# Patient Record
Sex: FEMALE | Race: WHITE | NOT HISPANIC OR LATINO | Employment: PART TIME | URBAN - METROPOLITAN AREA
[De-identification: names, ages, dates, MRNs, and addresses within clinical notes are randomized per-mention and may not be internally consistent; named-entity substitution may affect disease eponyms.]

---

## 2017-01-13 ENCOUNTER — OFFICE VISIT (OUTPATIENT)
Dept: FAMILY MEDICINE CLINIC | Facility: CLINIC | Age: 27
End: 2017-01-13

## 2017-01-13 VITALS
WEIGHT: 178 LBS | HEIGHT: 63 IN | DIASTOLIC BLOOD PRESSURE: 80 MMHG | HEART RATE: 82 BPM | RESPIRATION RATE: 17 BRPM | SYSTOLIC BLOOD PRESSURE: 130 MMHG | BODY MASS INDEX: 31.54 KG/M2 | OXYGEN SATURATION: 98 %

## 2017-01-13 DIAGNOSIS — R41.840 ATTENTION OR CONCENTRATION DEFICIT: ICD-10-CM

## 2017-01-13 DIAGNOSIS — L85.3 DRY SKIN: ICD-10-CM

## 2017-01-13 DIAGNOSIS — N92.1 METRORRHAGIA: Primary | ICD-10-CM

## 2017-01-13 DIAGNOSIS — F32.9 REACTIVE DEPRESSION: ICD-10-CM

## 2017-01-13 DIAGNOSIS — F84.5 ASPERGER'S DISORDER: ICD-10-CM

## 2017-01-13 DIAGNOSIS — N94.6 DYSMENORRHEA: ICD-10-CM

## 2017-01-13 PROCEDURE — 99214 OFFICE O/P EST MOD 30 MIN: CPT | Performed by: FAMILY MEDICINE

## 2017-01-13 RX ORDER — MELOXICAM 15 MG/1
15 TABLET ORAL
COMMUNITY
Start: 2016-11-23 | End: 2017-01-13

## 2017-01-13 RX ORDER — DULOXETIN HYDROCHLORIDE 60 MG/1
CAPSULE, DELAYED RELEASE ORAL
COMMUNITY
End: 2017-01-13

## 2017-01-13 RX ORDER — MUPIROCIN CALCIUM 20 MG/G
CREAM TOPICAL
COMMUNITY
End: 2017-01-13

## 2017-01-13 RX ORDER — PYRAZINAMIDE 500 MG/1
TABLET ORAL EVERY 4 HOURS
COMMUNITY
End: 2018-01-16

## 2017-01-13 RX ORDER — METHYLPHENIDATE HYDROCHLORIDE 18 MG/1
TABLET ORAL
COMMUNITY
End: 2017-01-13

## 2017-01-13 NOTE — PROGRESS NOTES
"Subjective   Ena Lyon is a 26 y.o. female.     History of Present Illness Here with her mother. Pt is adamant she does not want her mom to speak , at least until the end of the visit.  Last Dr was Dr Granado.  Hernated disc in L45 area. Surgery gave immed relief.This had not been precip by any event. R leg pain was not helped by PT. Then L leg started to hurt. Dr Hsieh ordered MRI. Dr Turner did surgery 2015 at [facility]. Back is \"OK\" Gets freaked out when it hurts now fearing it will happen again. Did PT post surgery. Does now do back exercises 4x a week.   In general does not like to exercise. ?Sometimes walks. 30 min would be common.    Goes to an on line program [employer] getting Masters in [employer] science. Scholarship. Nearly finished. Grad Dec '17. Works at [employer] and [employer].    Used to take Concerta and Cymbalta. Stopped both of them.   Thinks she needs to see a new psychologist or psychiatrist. Had seen Dr Garcia at [facility]. He is for adolescents. He had referred her to a different therapist. Had been to a therapist at [facility].    Had seen Dr Mercado - started pills at age 12 for very heavy periods. Stopped BCPs and menses not as heavy as prev. Wants to control acne and have smaller craming.  CO fatigue. Sleeps only 6 hours.    Skin dry on hands. Washes her hands numerous times all day long.    Has a hard time getting off the internet, says her mother. (Mother  since 1995)  The following portions of the patient's history were reviewed and updated as appropriate: allergies, current medications, past social history and problem list.    Review of Systems   Constitutional: Positive for fatigue. Negative for activity change, appetite change and unexpected weight change.   HENT: Negative for nosebleeds and trouble swallowing. Ear discharge: pruritis ear canals.    Eyes: Negative for pain and visual disturbance.   Respiratory: Negative for chest tightness, shortness of " "breath and wheezing.    Cardiovascular: Negative for chest pain and palpitations.   Gastrointestinal: Negative for abdominal pain and blood in stool.   Endocrine: Negative.    Genitourinary: Negative for difficulty urinating and hematuria.   Musculoskeletal: Positive for back pain (not severe at present, yang if she does exercises). Negative for joint swelling. Myalgias: no sciatica since surgery.   Skin: Negative for color change and rash.        Dry yang hands   Allergic/Immunologic: Negative.    Neurological: Negative for syncope and speech difficulty.   Hematological: Negative for adenopathy.   Psychiatric/Behavioral: Negative for agitation, confusion, decreased concentration and dysphoric mood. Behavioral problem: washes hands very freq. The patient is nervous/anxious.    All other systems reviewed and are negative.      Objective   Visit Vitals   • /80   • Pulse 82   • Resp 17   • Ht 63\" (160 cm)   • Wt 178 lb (80.7 kg)   • SpO2 98%   • BMI 31.53 kg/m2     Physical Exam   Constitutional: She is oriented to person, place, and time. She appears well-developed and well-nourished. No distress.   HENT:   Head: Normocephalic and atraumatic.   Eyes: Conjunctivae and EOM are normal. Pupils are equal, round, and reactive to light. Right eye exhibits no discharge. Left eye exhibits no discharge. No scleral icterus.   Neck: Normal range of motion. Neck supple. No tracheal deviation present. No thyromegaly present.   No bruits   Cardiovascular: Normal rate, regular rhythm, normal heart sounds, intact distal pulses and normal pulses.  Exam reveals no gallop.    No murmur heard.  Pulmonary/Chest: Effort normal and breath sounds normal. No respiratory distress. She has no wheezes. She has no rales.   Musculoskeletal: Normal range of motion.   R leg 33.5 inches. L leg 32.5 inches   Neurological: She is alert and oriented to person, place, and time. She exhibits normal muscle tone. Coordination normal.   Skin: Skin is " warm. No rash noted. No pallor.   Hands dry with mild eryth   Psychiatric: She has a normal mood and affect. Her behavior is normal. Judgment and thought content normal.   Controlling of her mother's comments.   Nursing note and vitals reviewed.      Assessment/Plan   Problem List Items Addressed This Visit        Musculoskeletal and Integument    Dry skin       Other    Asperger's disorder    Attention or concentration deficit    Depression    Dysmenorrhea      Other Visit Diagnoses     Metrorrhagia    -  Primary    Relevant Orders    Ambulatory Referral to Gynecology           Find a therapist.  If needs ADD med would need a psychiatrist. I am happy to Rx cymbalta at any time should she feel depressed.  Use her dry skin lotion every time she washes her hands.  Exercise, a variety of types is most beneficial.

## 2017-01-13 NOTE — MR AVS SNAPSHOT
Ena Lyon   1/13/2017 9:30 AM   Office Visit    Dept Phone:  [phone]   Encounter #:  [id number]    Provider:  Daiana Engle MD   Department:  [facility]                Your Full Care Plan              Today's Medication Changes          These changes are accurate as of: 1/13/17 10:44 AM.  If you have any questions, ask your nurse or doctor.               Stop taking medication(s)listed here:     BACTROBAN 2 % cream   Generic drug:  mupirocin           DULoxetine 60 MG capsule   Commonly known as:  CYMBALTA           methylphenidate 18 MG CR tablet   Commonly known as:  CONCERTA           MOBIC 15 MG tablet   Generic drug:  meloxicam                      Your Updated Medication List          This list is accurate as of: 1/13/17 10:44 AM.  Always use your most recent med list.                calcium carbonate 600 MG tablet   Commonly known as:  OS-OSMIN       cholecalciferol 1000 UNITS tablet   Commonly known as:  VITAMIN D3       ibuprofen 200 MG tablet   Commonly known as:  ADVIL,MOTRIN       MULTI VITAMIN/MINERALS PO       PROBIOTIC ADVANCED PO       SUPER B COMPLEX/C capsule       TYLENOL/CODEINE #3 300-30 MG per tablet   Generic drug:  acetaminophen-codeine               We Performed the Following     Ambulatory Referral to Gynecology       You Were Diagnosed With        Codes Comments    Metrorrhagia    -  Primary ICD-10-CM: N92.1  ICD-9-CM: 626.6       Instructions     None    Patient Instructions History      Upcoming Appointments     Visit Type Date Time Department    NEW PATIENT 1/13/2017  9:30 AM EVAN ELIZONDO      [employer] Signup     [facility] [employer] allows you to send messages to your doctor, view your test results, renew your prescriptions, schedule appointments, and more. To sign up, go to [employer] and click on the Sign Up Now link in the New User? box. Enter your [employer] Activation Code exactly as it  "appears below along with the last four digits of your Social Security Number and your Date of Birth () to complete the sign-up process. If you do not sign up before the expiration date, you must request a new code.    [employer] Activation Code: [email or web address]  Expires: 2017  5:36 AM    If you have questions, you can email [email or web address] or call [phone] to talk to our [employer] staff. Remember, [employer] is NOT to be used for urgent needs. For medical emergencies, dial 911.               Other Info from Your Visit           Allergies     Bupropion      Other reaction(s): SHAKY  Other reaction(s): BRIGHT      Reason for Visit     Establish Care           Vital Signs     Blood Pressure Pulse Respirations Height Weight Oxygen Saturation    130/80 82 17 63\" (160 cm) 178 lb (80.7 kg) 98%    Body Mass Index Smoking Status                31.53 kg/m2 Never Smoker          Problems and Diagnoses Noted     Bulging disc of backbone    Inequality of length of lower extremity    Vaginal bleeding between periods    -  Primary        "

## 2017-01-27 ENCOUNTER — OFFICE VISIT (OUTPATIENT)
Dept: OBSTETRICS AND GYNECOLOGY | Facility: CLINIC | Age: 27
End: 2017-01-27

## 2017-01-27 VITALS
DIASTOLIC BLOOD PRESSURE: 72 MMHG | SYSTOLIC BLOOD PRESSURE: 141 MMHG | BODY MASS INDEX: 31.54 KG/M2 | WEIGHT: 178 LBS | HEART RATE: 92 BPM | HEIGHT: 63 IN

## 2017-01-27 DIAGNOSIS — Z01.419 PAP SMEAR, AS PART OF ROUTINE GYNECOLOGICAL EXAMINATION: Primary | ICD-10-CM

## 2017-01-27 PROCEDURE — 99385 PREV VISIT NEW AGE 18-39: CPT | Performed by: OBSTETRICS & GYNECOLOGY

## 2017-01-27 RX ORDER — NORGESTIMATE AND ETHINYL ESTRADIOL 7DAYSX3 28
1 KIT ORAL DAILY
Qty: 28 TABLET | Refills: 12 | Status: SHIPPED | OUTPATIENT
Start: 2017-01-27 | End: 2017-02-24

## 2017-01-27 NOTE — MR AVS SNAPSHOT
[facility]  [phone]                    Ena Lyon   2017 8:00 AM   Office Visit    Dept Phone:  [phone]   Encounter #:  [id number]    Provider:  Jessica Coronado MD   Department:  [facility]                Your Full Care Plan              Your Updated Medication List          This list is accurate as of: 17  8:49 AM.  Always use your most recent med list.                calcium carbonate 600 MG tablet   Commonly known as:  OS-OSMIN       cholecalciferol 1000 UNITS tablet   Commonly known as:  VITAMIN D3       ibuprofen 200 MG tablet   Commonly known as:  ADVIL,MOTRIN       MULTI VITAMIN/MINERALS PO       PROBIOTIC ADVANCED PO       SUPER B COMPLEX/C capsule       TYLENOL/CODEINE #3 300-30 MG per tablet   Generic drug:  acetaminophen-codeine               We Performed the Following     Pap IG, Rfx HPV ASCU       You Were Diagnosed With        Codes Comments    Pap smear, as part of routine gynecological examination    -  Primary ICD-10-CM: Z01.419  ICD-9-CM: V76.2       Instructions     None    Patient Instructions History      Upcoming Appointments     Visit Type Date Time Department    NEW PATIENT 2017  8:00 AM EVAN CHAN      [employer] Signup     [facility] [employer] allows you to send messages to your doctor, view your test results, renew your prescriptions, schedule appointments, and more. To sign up, go to [employer] and click on the Sign Up Now link in the New User? box. Enter your [employer] Activation Code exactly as it appears below along with the last four digits of your Social Security Number and your Date of Birth () to complete the sign-up process. If you do not sign up before the expiration date, you must request a new code.    [employer] Activation Code: [email or web address]  Expires: 2/10/2017  8:48 AM    If you have questions, you can email [email or web address] or call [phone] to  "talk to our MyChart staff. Remember, MyChart is NOT to be used for urgent needs. For medical emergencies, dial 911.               Other Info from Your Visit           Allergies     Bupropion      Other reaction(s): SHAKY  Other reaction(s): SHAKY      Reason for Visit     Gynecologic Exam           Vital Signs     Blood Pressure Pulse Height Weight Last Menstrual Period Body Mass Index    141/72 92 63\" (160 cm) 178 lb (80.7 kg) 01/04/2017 (Exact Date) 31.53 kg/m2    Smoking Status                   Never Smoker           Problems and Diagnoses Noted     Screening for cervical cancer    -  Primary        "

## 2017-01-27 NOTE — PROGRESS NOTES
"Subjective   Ena Lyon is a 26 y.o. female annual exam.    History of Present Illness    Patient is a 26 y.o. for annual exam. Never sexually active. Patient wants to start birth control pills for menstrual cramping and acne. Patient was on ovcon 35 in the past but would like to try a different pill.    Health Maintenance   Topic Date Due   • PAP SMEAR  01/28/2016   • TDAP/TD VACCINES (3 - Td) 08/13/2019   • INFLUENZA VACCINE  Completed       The following portions of the patient's history were reviewed and updated as appropriate: allergies, current medications, past family history, past medical history, past social history, past surgical history and problem list.    Review of Systems   Genitourinary:        See HPI   All other systems reviewed and are negative.      Vitals:    01/27/17 0828   BP: 141/72   Pulse: 92   Weight: 178 lb (80.7 kg)   Height: 63\" (160 cm)       Objective   Physical Exam   Constitutional: She is oriented to person, place, and time. She appears well-developed and well-nourished.   HENT:   Head: Normocephalic and atraumatic.   Eyes: Conjunctivae and EOM are normal.   Neck: Normal range of motion. Neck supple. No thyromegaly present.   Cardiovascular: Normal rate, regular rhythm and normal heart sounds.    Pulmonary/Chest: Effort normal and breath sounds normal. Right breast exhibits no mass, no nipple discharge and no tenderness. Left breast exhibits no mass, no nipple discharge and no tenderness.   Abdominal: Soft. Bowel sounds are normal. There is no hepatosplenomegaly. There is no tenderness. No hernia.   Genitourinary: Rectum normal, vagina normal and uterus normal. Rectal exam shows no external hemorrhoid. There is no rash, tenderness or lesion on the right labia. There is no rash, tenderness or lesion on the left labia. Uterus is not enlarged and not tender. Cervix exhibits no discharge. Right adnexum displays no mass and no tenderness. Left adnexum displays no mass and no " tenderness. No tenderness in the vagina. No vaginal discharge found.   Musculoskeletal: Normal range of motion. She exhibits no edema.   Lymphadenopathy:        Right: No inguinal adenopathy present.        Left: No inguinal adenopathy present.   Neurological: She is alert and oriented to person, place, and time.   Skin: Skin is warm and dry. No rash noted.   Psychiatric: She has a normal mood and affect.   Vitals reviewed.        Assessment/Plan   Ena was seen today for gynecologic exam.    Diagnoses and all orders for this visit:    Pap smear, as part of routine gynecological examination  -     Pap IG, Rfx HPV ASCU                 Return in about 1 year (around 1/27/2018).

## 2017-02-01 LAB
CONV .: NORMAL
CYTOLOGIST CVX/VAG CYTO: NORMAL
CYTOLOGY CVX/VAG DOC THIN PREP: NORMAL
DX ICD CODE: NORMAL
HIV 1 & 2 AB SER-IMP: NORMAL
OTHER STN SPEC: NORMAL
PATH REPORT.FINAL DX SPEC: NORMAL
STAT OF ADQ CVX/VAG CYTO-IMP: NORMAL

## 2017-02-27 ENCOUNTER — TELEPHONE (OUTPATIENT)
Dept: OBSTETRICS AND GYNECOLOGY | Facility: CLINIC | Age: 27
End: 2017-02-27

## 2017-02-27 RX ORDER — NORETHINDRONE ACETATE AND ETHINYL ESTRADIOL 1MG-20(21)
1 KIT ORAL DAILY
Qty: 28 TABLET | Refills: 12 | Status: SHIPPED | OUTPATIENT
Start: 2017-02-27 | End: 2017-04-03

## 2017-02-27 NOTE — TELEPHONE ENCOUNTER
----- Message from Selena Ann sent at 2/27/2017  9:48 AM EST -----  Contact: patient  Patient said tri-sprintec tablet has not helped her with acne, and has also had side effects of depression. Patient is requesting to try a new birth control pill that she hopes will not have this side effect    C/B [phone].     I switched her to a lower dose pill. It can take 3-4 months to see a difference in acne and to get use to the pill.

## 2017-04-03 ENCOUNTER — OFFICE VISIT (OUTPATIENT)
Dept: FAMILY MEDICINE CLINIC | Facility: CLINIC | Age: 27
End: 2017-04-03

## 2017-04-03 VITALS
BODY MASS INDEX: 30.12 KG/M2 | RESPIRATION RATE: 17 BRPM | TEMPERATURE: 99.1 F | SYSTOLIC BLOOD PRESSURE: 120 MMHG | HEIGHT: 63 IN | OXYGEN SATURATION: 98 % | DIASTOLIC BLOOD PRESSURE: 80 MMHG | HEART RATE: 95 BPM | WEIGHT: 170 LBS

## 2017-04-03 DIAGNOSIS — H66.001 ACUTE SUPPURATIVE OTITIS MEDIA OF RIGHT EAR WITHOUT SPONTANEOUS RUPTURE OF TYMPANIC MEMBRANE, RECURRENCE NOT SPECIFIED: Primary | ICD-10-CM

## 2017-04-03 PROCEDURE — 99213 OFFICE O/P EST LOW 20 MIN: CPT | Performed by: FAMILY MEDICINE

## 2017-04-03 RX ORDER — NORETHINDRONE ACETATE AND ETHINYL ESTRADIOL AND FERROUS FUMARATE 1MG-20(24)
1 KIT ORAL DAILY
COMMUNITY
End: 2017-11-30 | Stop reason: SDUPTHER

## 2017-04-03 RX ORDER — AMOXICILLIN AND CLAVULANATE POTASSIUM 875; 125 MG/1; MG/1
1 TABLET, FILM COATED ORAL 2 TIMES DAILY
Qty: 14 TABLET | Refills: 0 | Status: SHIPPED | OUTPATIENT
Start: 2017-04-03 | End: 2017-11-30

## 2017-04-03 NOTE — PROGRESS NOTES
"Subjective   Ena Lyon is a 26 y.o. female.     History of Present Illness Here bc of sore throat since 5 days. Rhinitis and achy the next day. Temp up but no thermometer used. Ears popping and hurting, yang the R. Min cough yesterday only. Entire face hurts, ears the most.     The following portions of the patient's history were reviewed and updated as appropriate: allergies, current medications, past social history and problem list.    Review of Systems   Constitutional: Positive for fatigue. Negative for activity change and unexpected weight change.   HENT: Positive for congestion, ear pain (R), postnasal drip and sore throat.    Eyes: Negative for pain and discharge.   Respiratory: Positive for cough. Negative for chest tightness, shortness of breath and wheezing.    Cardiovascular: Negative for chest pain and palpitations.   Gastrointestinal: Negative for abdominal pain, diarrhea and vomiting.   Endocrine: Negative.    Genitourinary: Negative.    Musculoskeletal: Negative for joint swelling.   Skin: Negative for color change, rash and wound.   Allergic/Immunologic: Negative.    Neurological: Negative for seizures and syncope.   Psychiatric/Behavioral: Negative.        Objective   /80  Pulse 95  Temp 99.1 °F (37.3 °C)  Resp 17  Ht 63\" (160 cm)  Wt 170 lb (77.1 kg)  SpO2 98%  BMI 30.11 kg/m2  Physical Exam   Constitutional: She appears well-developed and well-nourished.   HENT:   Head: Normocephalic and atraumatic.   Right Ear: Tympanic membrane and ear canal normal.   Left Ear: Tympanic membrane, external ear and ear canal normal.   Mouth/Throat: Oropharynx is clear and moist.   Cerumen in R ear canal   Eyes: Conjunctivae are normal. Right eye exhibits no discharge. Left eye exhibits no discharge.   Neck: Normal range of motion. Neck supple. No thyromegaly present.   Cardiovascular: Normal rate, regular rhythm and normal heart sounds.    Pulmonary/Chest: Effort normal and breath sounds normal. No " respiratory distress. She has no wheezes. She has no rales.   Lymphadenopathy:     She has no cervical adenopathy.   Skin: Skin is warm and dry.   Psychiatric: She has a normal mood and affect. Judgment normal.   Vitals reviewed.      Assessment/Plan   Problem List Items Addressed This Visit     None      Visit Diagnoses     Acute suppurative otitis media of right ear without spontaneous rupture of tympanic membrane, recurrence not specified    -  Primary    Relevant Medications    amoxicillin-clavulanate (AUGMENTIN) 875-125 MG per tablet

## 2017-06-09 ENCOUNTER — TELEPHONE (OUTPATIENT)
Dept: OBSTETRICS AND GYNECOLOGY | Facility: CLINIC | Age: 27
End: 2017-06-09

## 2017-06-09 NOTE — TELEPHONE ENCOUNTER
----- Message from Selena Ann sent at 6/9/2017 12:02 PM EDT -----  Contact: Patient  Patient missed her birth control for one day and started her cycle a week and a half early. She said her periods are usually regular so she was a little concerned. Can this be normal after missing one pill?     Pt cb [phone]    Yes, it can be normal. She should take a pregnancy test to be on the safe side. Her periods may take 2-3 months to regulate.

## 2017-11-30 ENCOUNTER — OFFICE VISIT (OUTPATIENT)
Dept: FAMILY MEDICINE CLINIC | Facility: CLINIC | Age: 27
End: 2017-11-30

## 2017-11-30 VITALS
HEIGHT: 63 IN | OXYGEN SATURATION: 97 % | BODY MASS INDEX: 29.23 KG/M2 | HEART RATE: 76 BPM | RESPIRATION RATE: 17 BRPM | SYSTOLIC BLOOD PRESSURE: 164 MMHG | WEIGHT: 165 LBS | DIASTOLIC BLOOD PRESSURE: 94 MMHG

## 2017-11-30 DIAGNOSIS — F84.5 ASPERGER'S DISORDER: ICD-10-CM

## 2017-11-30 DIAGNOSIS — E55.9 HYPOVITAMINOSIS D: ICD-10-CM

## 2017-11-30 DIAGNOSIS — Z23 ENCOUNTER FOR IMMUNIZATION: ICD-10-CM

## 2017-11-30 DIAGNOSIS — Z00.00 HEALTH CARE MAINTENANCE: ICD-10-CM

## 2017-11-30 DIAGNOSIS — R03.0 ELEVATED BLOOD PRESSURE READING: ICD-10-CM

## 2017-11-30 DIAGNOSIS — Z00.00 ANNUAL PHYSICAL EXAM: Primary | ICD-10-CM

## 2017-11-30 DIAGNOSIS — R04.0 EPISTAXIS: ICD-10-CM

## 2017-11-30 DIAGNOSIS — F32.9 REACTIVE DEPRESSION: ICD-10-CM

## 2017-11-30 PROCEDURE — 90656 IIV3 VACC NO PRSV 0.5 ML IM: CPT | Performed by: FAMILY MEDICINE

## 2017-11-30 PROCEDURE — 99213 OFFICE O/P EST LOW 20 MIN: CPT | Performed by: FAMILY MEDICINE

## 2017-11-30 PROCEDURE — 90471 IMMUNIZATION ADMIN: CPT | Performed by: FAMILY MEDICINE

## 2017-11-30 PROCEDURE — 99395 PREV VISIT EST AGE 18-39: CPT | Performed by: FAMILY MEDICINE

## 2017-11-30 RX ORDER — FLUOXETINE HYDROCHLORIDE 40 MG/1
CAPSULE ORAL
COMMUNITY
Start: 2017-09-22 | End: 2018-01-16

## 2017-11-30 RX ORDER — ISOTRETINOIN 40 MG/1
CAPSULE, LIQUID FILLED ORAL
COMMUNITY
Start: 2017-11-16 | End: 2018-08-13

## 2017-11-30 RX ORDER — VALACYCLOVIR HYDROCHLORIDE 1 G/1
TABLET, FILM COATED ORAL
COMMUNITY
Start: 2017-10-09 | End: 2017-11-30

## 2017-11-30 RX ORDER — NORETHINDRONE ACETATE AND ETHINYL ESTRADIOL 1MG-20(21)
KIT ORAL
COMMUNITY
Start: 2017-11-11 | End: 2019-05-31

## 2017-11-30 RX ORDER — BUSPIRONE HYDROCHLORIDE 15 MG/1
TABLET ORAL
COMMUNITY
Start: 2017-09-22 | End: 2018-01-16 | Stop reason: SDUPTHER

## 2017-11-30 NOTE — PROGRESS NOTES
"Subjective   Ena Lyon is a 27 y.o. female.     History of Present Illness  Here for a CPE.  Not sure why her BP is elevated. Stuffy nose and hard to breath. Acne med makes her nose stuffy .  Gets huge blood clots nasal, 3x in 5 days.  No extreme anxiety.  But she has felt cold and clammy  But is not more anxious than usual.Not ill: no sore throat or cough.    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  Grad school for [employer] science.    Anxious. Had been going to therapist for 6 mo, but stopped bc busy.  Goes to pyschiatrist- Dr CANNON (she cannot remember his name)  Sleeps 8 hours plus a nap. Always tired. Not walking for exercise bc too tired.  Rarely drinks alcohol. Eating smaller portions of foods. Has lost 13 pounds this year that way. Did have a period of time when she lost appetite.    The following portions of the patient's history were reviewed and updated as appropriate: allergies, current medications, past social history and problem list.    Review of Systems   Constitutional: Positive for fatigue. Negative for appetite change, fever and unexpected weight change.   HENT: Positive for nosebleeds. Negative for ear pain, facial swelling and sore throat.    Eyes: Negative for pain and visual disturbance.   Respiratory: Negative for chest tightness, shortness of breath and wheezing.    Cardiovascular: Negative for chest pain and palpitations.   Gastrointestinal: Negative for abdominal pain and blood in stool.   Endocrine: Negative.    Genitourinary: Negative for difficulty urinating and hematuria.   Musculoskeletal: Negative for joint swelling.   Neurological: Negative for tremors, seizures and syncope.   Hematological: Negative for adenopathy.   Psychiatric/Behavioral: The patient is nervous/anxious.        Objective   /94  Pulse 76  Resp 17  Ht 63\" (160 cm)  Wt 165 lb (74.8 kg)  SpO2 97%  BMI 29.23 kg/m2  Physical Exam   Constitutional: She is oriented to person, place, and time. She " appears well-developed and well-nourished. No distress.   HENT:   Head: Normocephalic and atraumatic. Hair is normal.   Right Ear: Hearing, tympanic membrane, external ear and ear canal normal. No drainage. No decreased hearing is noted.   Left Ear: Hearing, tympanic membrane, external ear and ear canal normal. No decreased hearing is noted.   Nose: No nasal deformity.   Mouth/Throat: Oropharynx is clear and moist.   Eyes: Conjunctivae, EOM and lids are normal. Pupils are equal, round, and reactive to light. Lids are everted and swept, no foreign bodies found. Right eye exhibits no discharge. Left eye exhibits no discharge.   Fundoscopic exam:       The right eye shows red reflex.        The left eye shows red reflex.   Neck: Normal range of motion. Neck supple. No JVD present. No tracheal deviation present. No thyromegaly present.   Cardiovascular: Normal rate, regular rhythm, normal heart sounds, intact distal pulses and normal pulses.  Exam reveals no gallop and no friction rub.    No murmur heard.  Pulmonary/Chest: Effort normal and breath sounds normal. No respiratory distress. She has no wheezes. She has no rales. She exhibits no tenderness. Right breast exhibits no mass and no tenderness. Left breast exhibits no mass and no tenderness.   Abdominal: Soft. Bowel sounds are normal. She exhibits no distension and no mass. There is no tenderness. There is no rebound and no guarding. No hernia.   Musculoskeletal: Normal range of motion. She exhibits no edema, tenderness or deformity.   Lymphadenopathy:     She has no cervical adenopathy.        Right: No inguinal adenopathy present.        Left: No inguinal adenopathy present.   Neurological: She is alert and oriented to person, place, and time. She has normal reflexes. She displays normal reflexes. No cranial nerve deficit. She exhibits normal muscle tone. Coordination normal.   Skin: Skin is warm and dry. No rash noted. She is not diaphoretic. No erythema.    Psychiatric: She has a normal mood and affect. Her behavior is normal. Judgment and thought content normal.   Nursing note and vitals reviewed.      Assessment/Plan   Problem List Items Addressed This Visit        Other    Asperger's disorder    Relevant Medications    busPIRone (BUSPAR) 15 MG tablet    FLUoxetine (PROzac) 40 MG capsule    Depression    Relevant Medications    busPIRone (BUSPAR) 15 MG tablet    FLUoxetine (PROzac) 40 MG capsule    Health care maintenance      Other Visit Diagnoses     Annual physical exam    -  Primary    Relevant Orders    TSH    Comprehensive Metabolic Panel    Hypovitaminosis D        Relevant Orders    Vitamin D 25 Hydroxy    Epistaxis        Relevant Medications    mupirocin (BACTROBAN) 2 % ointment    Encounter for immunization        Relevant Orders    Flu Vaccine Intradermal Quad 18-64YR (FLUZONE INJ [id number]) (Completed)    Elevated blood pressure reading               Stop in for BP check next week.I really feel that she is very anxious in general, and about todays exam as well.  Had CBC,lipid at gyne in Nov.

## 2017-12-01 LAB
25(OH)D3+25(OH)D2 SERPL-MCNC: 84.9 NG/ML (ref 30–100)
ALBUMIN SERPL-MCNC: 4.8 G/DL (ref 3.5–5.2)
ALBUMIN/GLOB SERPL: 1.7 G/DL
ALP SERPL-CCNC: 62 U/L (ref 39–117)
ALT SERPL-CCNC: 17 U/L (ref 1–33)
AST SERPL-CCNC: 18 U/L (ref 1–32)
BILIRUB SERPL-MCNC: 0.6 MG/DL (ref 0.1–1.2)
BUN SERPL-MCNC: 11 MG/DL (ref 6–20)
BUN/CREAT SERPL: 13.8 (ref 7–25)
CALCIUM SERPL-MCNC: 10 MG/DL (ref 8.6–10.5)
CHLORIDE SERPL-SCNC: 99 MMOL/L (ref 98–107)
CO2 SERPL-SCNC: 26.8 MMOL/L (ref 22–29)
CREAT SERPL-MCNC: 0.8 MG/DL (ref 0.57–1)
GFR SERPLBLD CREATININE-BSD FMLA CKD-EPI: 104 ML/MIN/1.73
GFR SERPLBLD CREATININE-BSD FMLA CKD-EPI: 86 ML/MIN/1.73
GLOBULIN SER CALC-MCNC: 2.9 GM/DL
GLUCOSE SERPL-MCNC: 88 MG/DL (ref 65–99)
POTASSIUM SERPL-SCNC: 4.4 MMOL/L (ref 3.5–5.2)
PROT SERPL-MCNC: 7.7 G/DL (ref 6–8.5)
SODIUM SERPL-SCNC: 139 MMOL/L (ref 136–145)
TSH SERPL DL<=0.005 MIU/L-ACNC: 1.88 MIU/ML (ref 0.27–4.2)

## 2017-12-07 ENCOUNTER — TELEPHONE (OUTPATIENT)
Dept: FAMILY MEDICINE CLINIC | Facility: CLINIC | Age: 27
End: 2017-12-07

## 2017-12-07 ENCOUNTER — CLINICAL SUPPORT (OUTPATIENT)
Dept: FAMILY MEDICINE CLINIC | Facility: CLINIC | Age: 27
End: 2017-12-07

## 2017-12-07 VITALS — SYSTOLIC BLOOD PRESSURE: 140 MMHG | DIASTOLIC BLOOD PRESSURE: 94 MMHG

## 2017-12-07 DIAGNOSIS — I10 ESSENTIAL HYPERTENSION: ICD-10-CM

## 2017-12-07 NOTE — TELEPHONE ENCOUNTER
140/94- please put into her vital record.Tell her she needs to get in with one of the new drs soon. She may have hypertension and needs to start medicine. ONe more elevation, and she needs meds.

## 2017-12-08 NOTE — TELEPHONE ENCOUNTER
Message left on voicemail to come into office or have checked at a pharmacy and call us with the numbers

## 2018-01-16 ENCOUNTER — OFFICE VISIT (OUTPATIENT)
Dept: FAMILY MEDICINE CLINIC | Facility: CLINIC | Age: 28
End: 2018-01-16

## 2018-01-16 VITALS
HEART RATE: 89 BPM | SYSTOLIC BLOOD PRESSURE: 118 MMHG | BODY MASS INDEX: 30.29 KG/M2 | OXYGEN SATURATION: 99 % | WEIGHT: 171 LBS | TEMPERATURE: 98.4 F | DIASTOLIC BLOOD PRESSURE: 82 MMHG

## 2018-01-16 DIAGNOSIS — R05.9 COUGH: ICD-10-CM

## 2018-01-16 DIAGNOSIS — F32.A DEPRESSION, UNSPECIFIED DEPRESSION TYPE: ICD-10-CM

## 2018-01-16 DIAGNOSIS — J11.1 FLU: Primary | ICD-10-CM

## 2018-01-16 LAB
EXPIRATION DATE: NORMAL
FLUAV AG NPH QL: NEGATIVE
FLUBV AG NPH QL: NEGATIVE
INTERNAL CONTROL: NORMAL
Lab: NORMAL

## 2018-01-16 PROCEDURE — 99214 OFFICE O/P EST MOD 30 MIN: CPT | Performed by: NURSE PRACTITIONER

## 2018-01-16 PROCEDURE — 87804 INFLUENZA ASSAY W/OPTIC: CPT | Performed by: NURSE PRACTITIONER

## 2018-01-16 RX ORDER — BUSPIRONE HYDROCHLORIDE 15 MG/1
15 TABLET ORAL 3 TIMES DAILY
Qty: 90 TABLET | Refills: 3 | Status: SHIPPED | OUTPATIENT
Start: 2018-01-16 | End: 2018-08-13 | Stop reason: DRUGHIGH

## 2018-01-16 RX ORDER — FLUOXETINE HYDROCHLORIDE 40 MG/1
40 CAPSULE ORAL DAILY
Qty: 30 CAPSULE | Refills: 3 | Status: SHIPPED | OUTPATIENT
Start: 2018-01-16 | End: 2018-02-15 | Stop reason: SDUPTHER

## 2018-01-16 NOTE — PATIENT INSTRUCTIONS
"Influenza, Adult  Influenza (“the flu\") is an infection in the lungs, nose, and throat (respiratory tract). It is caused by a virus. The flu causes many common cold symptoms, as well as a high fever and body aches. It can make you feel very sick.  The flu spreads easily from person to person (is contagious). Getting a flu shot (influenza vaccination) every year is the best way to prevent the flu.  Follow these instructions at home:  · Take over-the-counter and prescription medicines only as told by your doctor.  · Use a cool mist humidifier to add moisture (humidity) to the air in your home. This can make it easier to breathe.  · Rest as needed.  · Drink enough fluid to keep your pee (urine) clear or pale yellow.  · Cover your mouth and nose when you cough or sneeze.  · Wash your hands with soap and water often, especially after you cough or sneeze. If you cannot use soap and water, use hand .  · Stay home from work or school as told by your doctor. Unless you are visiting your doctor, try to avoid leaving home until your fever has been gone for 24 hours without the use of medicine.  · Keep all follow-up visits as told by your doctor. This is important.  How is this prevented?  · Getting a yearly (annual) flu shot is the best way to avoid getting the flu. You may get the flu shot in late summer, fall, or winter. Ask your doctor when you should get your flu shot.  · Wash your hands often or use hand  often.  · Avoid contact with people who are sick during cold and flu season.  · Eat healthy foods.  · Drink plenty of fluids.  · Get enough sleep.  · Exercise regularly.  Contact a doctor if:  · You get new symptoms.  · You have:  ¨ Chest pain.  ¨ Watery poop (diarrhea).  ¨ A fever.  · Your cough gets worse.  · You start to have more mucus.  · You feel sick to your stomach (nauseous).  · You throw up (vomit).  Get help right away if:  · You start to be short of breath or have trouble breathing.  · Your " skin or nails turn a bluish color.  · You have very bad pain or stiffness in your neck.  · You get a sudden headache.  · You get sudden pain in your face or ear.  · You cannot stop throwing up.  This information is not intended to replace advice given to you by your health care provider. Make sure you discuss any questions you have with your health care provider.  Document Released: 09/26/2009 Document Revised: 05/25/2017 Document Reviewed: 10/11/2016  [employer] Interactive Patient Education © 2017 [employer]

## 2018-01-16 NOTE — PROGRESS NOTES
Subjective   Ena Lyon is a 27 y.o. female.     HPI Comments: Patient complains cough congestion body aches sore throat since Saturday  Ear congestion  She's had chills feels feverish    No shortness of breath    No history of asthma no immunocompromise  No tobacco    History depression anxiety  Her psychiatrist recently left the practice practices not have anyone covering and no one to recommend  She's been stable on her medication BuSpar and Prozac for 8 months  Depression and anxiety stable no history of bipolar or manic depression never suicidal ideation never hospitalizations           The following portions of the patient's history were reviewed and updated as appropriate: allergies, current medications, past family history, past social history, past surgical history and problem list.    Review of Systems   Constitutional: Positive for chills, fatigue and fever. Negative for appetite change and unexpected weight change.   HENT: Positive for postnasal drip, sinus pressure and sore throat. Negative for congestion and ear pain.    Eyes: Negative.    Respiratory: Positive for cough. Negative for chest tightness, shortness of breath and wheezing.    Cardiovascular: Negative for chest pain, palpitations and leg swelling.   Gastrointestinal: Negative for abdominal pain and constipation.   Genitourinary: Negative for difficulty urinating, dysuria and urgency.   Musculoskeletal: Negative for arthralgias and myalgias.   Skin: Negative for rash and wound.   Neurological: Negative for dizziness, speech difficulty, weakness, numbness and headaches.   Psychiatric/Behavioral: Negative for sleep disturbance. The patient is not nervous/anxious.        Objective   Physical Exam   Constitutional: She is oriented to person, place, and time. She appears well-developed and well-nourished.   Nontoxic pleasant   HENT:   Head: Normocephalic.   Mouth/Throat: Oropharynx is clear and moist.   Tm clear patrick no mass canal patent without  d/c\    Turbinates congested   Eyes: Conjunctivae are normal. Pupils are equal, round, and reactive to light. No scleral icterus.   Neck: Neck supple. No JVD present. No thyromegaly present.   Cardiovascular: Normal rate, regular rhythm and normal heart sounds.  Exam reveals no gallop and no friction rub.    No murmur heard.  Pulmonary/Chest: Effort normal and breath sounds normal. No stridor. No respiratory distress. She has no wheezes. She has no rales.   Abdominal: Soft. Bowel sounds are normal. She exhibits no distension. There is no tenderness.   No hepatosplenomegaly, no ascites,   Musculoskeletal: She exhibits no edema or tenderness.   Lymphadenopathy:     She has no cervical adenopathy.   Neurological: She is alert and oriented to person, place, and time. She has normal reflexes.   Skin: Skin is warm and dry. No rash noted. No erythema.   Psychiatric: She has a normal mood and affect. Her behavior is normal. Judgment and thought content normal.   Vitals reviewed.      Assessment/Plan   Ena was seen today for patient c/o cough sore throat.    Diagnoses and all orders for this visit:    Flu    Cough  -     POC Influenza A / B    Depression, unspecified depression type    Other orders  -     busPIRone (BUSPAR) 15 MG tablet; Take 1 tablet by mouth 3 (Three) Times a Day.  -     FLUoxetine (PROzac) 40 MG capsule; Take 1 capsule by mouth Daily.                  Patient's symptoms are flulike  Although her flu test is negative likely has flu with  Onset of sore throat body aches fever chills  Cough congestion  No chest pain or shortness of breath  Her chest is clear    Push fluids  Plenty of rest  OTC medicine for cough or congestion such as Mucinex DM  Tylenol or ibuprofen 600 as needed for fever aches or pains    Urgent recheck urgent care ER for late onset fever late onset worsening    Refills for Prozac and BuSpar  If increased depression suicidal ideation  Urgent recheck you are  She may schedule appointment  with psychiatry  Otherwise she may follow-up here in 4 months sooner for any problems    Reviewed medication list  She denies pregnancy  She is compliant with contraception  She sees dermatology and takes Accutane

## 2018-01-23 ENCOUNTER — OFFICE VISIT (OUTPATIENT)
Dept: FAMILY MEDICINE CLINIC | Facility: CLINIC | Age: 28
End: 2018-01-23

## 2018-01-23 VITALS
HEART RATE: 90 BPM | DIASTOLIC BLOOD PRESSURE: 80 MMHG | SYSTOLIC BLOOD PRESSURE: 140 MMHG | WEIGHT: 173 LBS | BODY MASS INDEX: 30.65 KG/M2 | TEMPERATURE: 97.6 F | HEIGHT: 63 IN

## 2018-01-23 DIAGNOSIS — H60.501 ACUTE OTITIS EXTERNA OF RIGHT EAR, UNSPECIFIED TYPE: ICD-10-CM

## 2018-01-23 DIAGNOSIS — H61.21 IMPACTED CERUMEN OF RIGHT EAR: Primary | ICD-10-CM

## 2018-01-23 DIAGNOSIS — R03.0 ELEVATED BLOOD PRESSURE READING: ICD-10-CM

## 2018-01-23 PROCEDURE — 99214 OFFICE O/P EST MOD 30 MIN: CPT | Performed by: NURSE PRACTITIONER

## 2018-01-23 NOTE — PATIENT INSTRUCTIONS
Earwax Buildup  Your ears make a substance called earwax. It may also be called cerumen. Sometimes, too much earwax builds up in your ear canal. This can cause ear pain and make it harder for you to hear.  CAUSES  This condition is caused by too much earwax production or buildup.  RISK FACTORS  The following factors may make you more likely to develop this condition:  · Cleaning your ears often with swabs.  · Having narrow ear canals.  · Having earwax that is overly thick or sticky.  · Having eczema.  · Being dehydrated.  SYMPTOMS  Symptoms of this condition include:  · Reduced hearing.  · Ear drainage.  · Ear pain.  · Ear itch.  · A feeling of fullness in the ear or feeling that the ear is plugged.  · Ringing in the ear.  · Coughing.  DIAGNOSIS  Your health care provider can diagnose this condition based on your symptoms and medical history. Your health care provider will also do an ear exam to look inside your ear with a scope (otoscope). You may also have a hearing test.  TREATMENT  Treatment for this condition includes:  · Over-the-counter or prescription ear drops to soften the earwax.  · Earwax removal by a health care provider. This may be done:  ¨ By flushing the ear with body-temperature water.  ¨ With a medical instrument that has a loop at the end (earwax curette).  ¨ With a suction device.  HOME CARE INSTRUCTIONS  · Take over-the-counter and prescription medicines only as told by your health care provider.  · Do not put any objects, including an ear swab, into your ear. You can clean the opening of your ear canal with a washcloth.  · Drink enough water to keep your urine clear or pale yellow.  · If you have frequent earwax buildup or you use hearing aids, consider seeing your health care provider every 6-12 months for routine preventive ear cleanings. Keep all follow-up visits as told by your health care provider.  SEEK MEDICAL CARE IF:  · You have ear pain.  · Your condition does not improve with  treatment.  · You have hearing loss.  · You have blood, pus, or other fluid coming from your ear.  This information is not intended to replace advice given to you by your health care provider. Make sure you discuss any questions you have with your health care provider.  Document Released: 01/25/2006 Document Revised: 04/10/2017 Document Reviewed: 08/03/2016  [employer] Interactive Patient Education © 2017 [employer]    Earwax Buildup  Your ears make a substance called earwax. It may also be called cerumen. Sometimes, too much earwax builds up in your ear canal. This can cause ear pain and make it harder for you to hear.  CAUSES  This condition is caused by too much earwax production or buildup.  RISK FACTORS  The following factors may make you more likely to develop this condition:  · Cleaning your ears often with swabs.  · Having narrow ear canals.  · Having earwax that is overly thick or sticky.  · Having eczema.  · Being dehydrated.  SYMPTOMS  Symptoms of this condition include:  · Reduced hearing.  · Ear drainage.  · Ear pain.  · Ear itch.  · A feeling of fullness in the ear or feeling that the ear is plugged.  · Ringing in the ear.  · Coughing.  DIAGNOSIS  Your health care provider can diagnose this condition based on your symptoms and medical history. Your health care provider will also do an ear exam to look inside your ear with a scope (otoscope). You may also have a hearing test.  TREATMENT  Treatment for this condition includes:  · Over-the-counter or prescription ear drops to soften the earwax.  · Earwax removal by a health care provider. This may be done:  ¨ By flushing the ear with body-temperature water.  ¨ With a medical instrument that has a loop at the end (earwax curette).  ¨ With a suction device.  HOME CARE INSTRUCTIONS  · Take over-the-counter and prescription medicines only as told by your health care provider.  · Do not put any objects, including an ear swab, into your ear. You can clean the  opening of your ear canal with a washcloth.  · Drink enough water to keep your urine clear or pale yellow.  · If you have frequent earwax buildup or you use hearing aids, consider seeing your health care provider every 6-12 months for routine preventive ear cleanings. Keep all follow-up visits as told by your health care provider.  SEEK MEDICAL CARE IF:  · You have ear pain.  · Your condition does not improve with treatment.  · You have hearing loss.  · You have blood, pus, or other fluid coming from your ear.  This information is not intended to replace advice given to you by your health care provider. Make sure you discuss any questions you have with your health care provider.  Document Released: 01/25/2006 Document Revised: 04/10/2017 Document Reviewed: 08/03/2016  [employer] Interactive Patient Education © 2017 [employer]        Discharge instructions    Recheck if increasing ear pain fever  Recheck ears in 6 months  Recheck blood pressure  Sometime within the next week  Check blood pressure  Weekly ×3 weeks  Then monthly  ×3 months    Low-dose Sudafed such as 30-60 mg  Twice daily as needed for sinus pressure only if blood pressures controlled    If averaging greater than 140/90  Return office  Decrease sodium  Modest weight loss  Decrease portion size in calories  Decrease processed sweets    Thank You,      James Epley, NP    Use Cortisporin drops as instructed  If increased pain redness swelling fever urgent recheck  Recheck in one week if pain not greatly improved

## 2018-01-23 NOTE — PROGRESS NOTES
Subjective   Ena Lyon is a 27 y.o. female.     HPI Comments: Patient complains mild discomfort right ear  Recent flulike symptoms cough congestion sinus pressure symptoms have nearly resolved still some sinus pressure no sore throat no fever  No chest pain or shortness of breath  Mild discomfort right ear without throbbing discharge or increasing pain or fever  She'll wax impaction on exam and was told to follow-up    Blood pressures 140/80  Normal tensive last visit  Per records history of hypertension    Difficult to get a good history she does not smoke  Takes contraception  Goes up the doctor's office sometimes  Not sure if she has whitecoat?         The following portions of the patient's history were reviewed and updated as appropriate: allergies, current medications, past family history, past medical history, past surgical history and problem list.    Review of Systems   Constitutional: Negative for chills and fever.   HENT: Positive for ear pain and sinus pressure.    Respiratory: Positive for cough. Negative for shortness of breath.    All other systems reviewed and are negative.      Objective   Physical Exam   Constitutional: She is oriented to person, place, and time. She appears well-developed.   HENT:   Head: Normocephalic.   Turbinates congested  Left TM clear  Right impacted 80%  Thick dark brown dry impaction  No tragus tenderness  No discharge or odor    Irrigation  Medical assistant unsuccessful Darrin's  Irrigation per myself ×5 minutes  Gently with a curet removed to moderate size pieces of wax  Patient tolerated well  Continue visualize a portion of her tympanic membrane  Appears normal  However stool secured from the remnants of rinsing solution  But her canal is clear except for some mild erythema and 2 small blisterlike erythemic swelling approximately 3 mm each  No pustules no abscess no cellulitis  Appears more chronically irritated, due to water trapping  And adhesions  Scant amount  of blood  Irrigated out  Patient leaving without bleeding or distress  No tragus tenderness no mastoid tenderness   Eyes: Pupils are equal, round, and reactive to light.   Cardiovascular: Normal rate and regular rhythm.    Pulmonary/Chest: Effort normal.   Neurological: She is alert and oriented to person, place, and time.   Skin: Skin is warm and dry.   Psychiatric: She has a normal mood and affect. Her behavior is normal. Judgment and thought content normal.   Vitals reviewed.      Assessment/Plan   Ena was seen today for right ear pain.    Diagnoses and all orders for this visit:    Impacted cerumen of right ear    Elevated blood pressure reading                Discharge instructions    Recheck if increasing ear pain fever  Recheck ears in 6 months  Recheck blood pressure  Sometime within the next week  Check blood pressure  Weekly ×3 weeks  Then monthly  ×3 months    Low-dose Sudafed such as 30-60 mg  Twice daily as needed for sinus pressure only if blood pressures controlled    If averaging greater than 140/90  Return office  Decrease sodium  Modest weight loss  Decrease portion size in calories  Decrease processed sweets    Thank You,      James Epley, NP    Use Cortisporin drops as instructed  If increased pain redness swelling fever urgent recheck  Recheck in one week if pain not greatly improved

## 2018-01-29 ENCOUNTER — TELEPHONE (OUTPATIENT)
Dept: FAMILY MEDICINE CLINIC | Facility: CLINIC | Age: 28
End: 2018-01-29

## 2018-01-29 NOTE — TELEPHONE ENCOUNTER
See if she's taken Sudafed if stop    As she rechecked her blood pressure??      Please clarify her symptoms  If she had vertigo headache  Weakness  Pain  Fever  Hearing problem  Any ear pain  Any paresthesias upper extremities or other problems?    Any severe symptoms  Dizziness paresthesias weakness severe headache  Emergency room    Please advise me on her symptoms    Thank you

## 2018-01-29 NOTE — TELEPHONE ENCOUNTER
----- Message from Dennies Garrick sent at 1/29/2018 10:22 AM EST -----  Contact: [phone]  PATIENT WAS SEEN ON 1/23/18 FOR RIGHT EAR IRRIGATION SHE IS NOW COMPLAINING OF DIZZINESS, LIGHTHEADED AND FEEL LIKE SHE IS GOING TO FAINT.    PLEASE ADVISE

## 2018-02-02 ENCOUNTER — TELEPHONE (OUTPATIENT)
Dept: FAMILY MEDICINE CLINIC | Facility: CLINIC | Age: 28
End: 2018-02-02

## 2018-02-02 RX ORDER — FLUTICASONE PROPIONATE 50 MCG
2 SPRAY, SUSPENSION (ML) NASAL DAILY
Qty: 1 BOTTLE | Refills: 1 | Status: SHIPPED | OUTPATIENT
Start: 2018-02-02

## 2018-02-02 RX ORDER — MECLIZINE HCL 12.5 MG/1
TABLET ORAL
Qty: 20 TABLET | Refills: 1 | Status: SHIPPED | OUTPATIENT
Start: 2018-02-02 | End: 2018-08-13

## 2018-02-02 NOTE — TELEPHONE ENCOUNTER
----- Message from Dennies Garrick sent at 2/2/2018  5:16 PM EST -----  Patient c/o dizziness & vertigo

## 2018-02-02 NOTE — TELEPHONE ENCOUNTER
Please call patient  If she's taken Sudafed stop    Have her recheck her blood pressure  If greater than 150 or equal to  Go to urgent care to get checked out    If greater than 140 or equal to  Recheck in the office this week    If severe dizziness weakness severe headache  Emergency room    She shouldn't drive until dizziness has been gone for at least a week    Flonase for nasal sinuses as they are related to ears and balance    If she is not better by Monday  She is to call me so I can send her to ENT    I've given her meclizine for symptoms only  Caution could cause sedation but I decreased the dose for her  It helps with nausea or feelings of dizziness    This should go away on its own  I would like her to call me Monday to see how she is doing  Thank you

## 2018-02-05 ENCOUNTER — TELEPHONE (OUTPATIENT)
Dept: OBSTETRICS AND GYNECOLOGY | Facility: CLINIC | Age: 28
End: 2018-02-05

## 2018-02-05 ENCOUNTER — TELEPHONE (OUTPATIENT)
Dept: FAMILY MEDICINE CLINIC | Facility: CLINIC | Age: 28
End: 2018-02-05

## 2018-02-05 DIAGNOSIS — R42 DIZZINESS: Primary | ICD-10-CM

## 2018-02-05 NOTE — TELEPHONE ENCOUNTER
Please call patient  He has her dizziness better  I see that she went to urgent care    If not she may benefit from a referral to ENT  Please let me know

## 2018-02-05 NOTE — TELEPHONE ENCOUNTER
Vicki    Please let her know that we refilled her birth control.  She needs an annual exam to get more, however.    Thanks    Victoria

## 2018-02-07 NOTE — TELEPHONE ENCOUNTER
Please call patient     Check blood pressure daily  Return office ASAP  We'll refer to ENT  Make sure she is using Flonase  If blood pressure consistently over 140/90  Urgent care ER or urgent recheck here    If severe headache  Weakness  Blood pressure over 170 top number emergency room    Thank you!

## 2018-02-07 NOTE — TELEPHONE ENCOUNTER
Left detailed message on machine. I also said please call Dennies back in the morning to make sure she understood the message

## 2018-02-08 NOTE — TELEPHONE ENCOUNTER
I referred her to ENT  Hopefully we'll get her in within the next week  Return office ASAP for evaluation here as well  Dizziness can come from several areas of the body  Including ears her balance centers    If severe, or severe headache vision change racing heart emergency room      Hopefully she'll feel better soon  Thank you

## 2018-02-15 RX ORDER — FLUOXETINE HYDROCHLORIDE 40 MG/1
40 CAPSULE ORAL DAILY
Qty: 90 CAPSULE | Refills: 1 | Status: SHIPPED | OUTPATIENT
Start: 2018-02-15 | End: 2019-01-14 | Stop reason: SDUPTHER

## 2018-03-27 ENCOUNTER — TELEPHONE (OUTPATIENT)
Dept: FAMILY MEDICINE CLINIC | Facility: CLINIC | Age: 28
End: 2018-03-27

## 2018-08-13 ENCOUNTER — OFFICE VISIT (OUTPATIENT)
Dept: INTERNAL MEDICINE | Facility: CLINIC | Age: 28
End: 2018-08-13

## 2018-08-13 VITALS
BODY MASS INDEX: 32.43 KG/M2 | WEIGHT: 183 LBS | DIASTOLIC BLOOD PRESSURE: 80 MMHG | SYSTOLIC BLOOD PRESSURE: 124 MMHG | HEIGHT: 63 IN

## 2018-08-13 DIAGNOSIS — F84.5 ASPERGER'S DISORDER: Chronic | ICD-10-CM

## 2018-08-13 DIAGNOSIS — G89.29 CHRONIC BILATERAL LOW BACK PAIN WITHOUT SCIATICA: Chronic | ICD-10-CM

## 2018-08-13 DIAGNOSIS — D64.9 ANEMIA, UNSPECIFIED TYPE: ICD-10-CM

## 2018-08-13 DIAGNOSIS — D72.819 LEUKOPENIA, UNSPECIFIED TYPE: ICD-10-CM

## 2018-08-13 DIAGNOSIS — R03.0 ELEVATED BLOOD PRESSURE READING: Primary | Chronic | ICD-10-CM

## 2018-08-13 DIAGNOSIS — M41.35 THORACOGENIC SCOLIOSIS OF THORACOLUMBAR REGION: ICD-10-CM

## 2018-08-13 DIAGNOSIS — Z98.890 S/P LUMBAR LAMINECTOMY: Chronic | ICD-10-CM

## 2018-08-13 DIAGNOSIS — M54.50 CHRONIC BILATERAL LOW BACK PAIN WITHOUT SCIATICA: Chronic | ICD-10-CM

## 2018-08-13 DIAGNOSIS — F32.A DEPRESSION, UNSPECIFIED DEPRESSION TYPE: Chronic | ICD-10-CM

## 2018-08-13 PROBLEM — M96.1 LUMBAR POST-LAMINECTOMY SYNDROME: Status: ACTIVE | Noted: 2018-06-04

## 2018-08-13 LAB
BASOPHILS # BLD AUTO: 0.04 10*3/MM3 (ref 0–0.2)
BASOPHILS NFR BLD AUTO: 0.7 % (ref 0–1.5)
EOSINOPHIL # BLD AUTO: 0.12 10*3/MM3 (ref 0–0.7)
EOSINOPHIL # BLD AUTO: 2.1 % (ref 0.3–6.2)
ERYTHROCYTE [DISTWIDTH] IN BLOOD BY AUTOMATED COUNT: 13.4 % (ref 11.7–13)
HCT VFR BLD AUTO: 39.8 % (ref 35.6–45.5)
HGB BLD-MCNC: 12.6 G/DL (ref 11.9–15.5)
IMM GRANULOCYTES # BLD: 0 10*3/MM3 (ref 0–0.03)
IMM GRANULOCYTES NFR BLD: 0 % (ref 0–0.5)
LYMPHOCYTES # BLD AUTO: 1.9 10*3/MM3 (ref 0.9–4.8)
LYMPHOCYTES NFR BLD AUTO: 32.6 % (ref 19.6–45.3)
MCH RBC QN AUTO: 29.6 PG (ref 26.9–32)
MCHC RBC AUTO-ENTMCNC: 31.7 G/DL (ref 32.4–36.3)
MCV RBC AUTO: 93.4 FL (ref 80.5–98.2)
MONOCYTES # BLD AUTO: 0.67 10*3/MM3 (ref 0.2–1.2)
MONOCYTES NFR BLD AUTO: 11.5 % (ref 5–12)
NEUTROPHILS # BLD AUTO: 3.09 10*3/MM3 (ref 1.9–8.1)
NEUTROPHILS NFR BLD AUTO: 53.1 % (ref 42.7–76)
PLATELET # BLD AUTO: 207 10*3/MM3 (ref 140–500)
RBC # BLD AUTO: 4.26 10*6/MM3 (ref 3.9–5.2)
WBC # BLD AUTO: 5.82 10*3/MM3 (ref 4.5–10.7)

## 2018-08-13 PROCEDURE — 99204 OFFICE O/P NEW MOD 45 MIN: CPT | Performed by: INTERNAL MEDICINE

## 2018-08-13 RX ORDER — BUSPIRONE HYDROCHLORIDE 10 MG/1
TABLET ORAL
COMMUNITY
End: 2021-05-14 | Stop reason: SDDI

## 2018-08-13 NOTE — PROGRESS NOTES
Subjective   Ena Lyon is a 28 y.o. female.     History of Present Illness     The following portions of the patient's history were reviewed and updated as appropriate: allergies, current medications, past family history, past medical history, past social history, past surgical history and problem list.  27 yo/f with a chief complaint of multi[le medical issues including, scoliosis, s/p lumbar laminectomy, Asperger's, ADD, anemia and leukopenia, depression and episodically elevated BP, here for an inititial evaluation. I reviewed her previous medical records with her.   Review of Systems   Constitutional: Negative.    HENT: Negative.    Eyes: Negative.    Respiratory: Negative.    Cardiovascular: Negative.    Gastrointestinal: Negative.    Endocrine: Negative.    Genitourinary: Negative.    Musculoskeletal: Positive for arthralgias and back pain.   Skin: Negative.    Allergic/Immunologic: Negative.    Neurological: Negative.    Hematological: Negative.    Psychiatric/Behavioral: Positive for dysphoric mood.       Objective   Physical Exam   Constitutional: She is oriented to person, place, and time. She appears well-developed and well-nourished.   HENT:   Head: Normocephalic and atraumatic.   Left Ear: External ear normal.   Eyes: Pupils are equal, round, and reactive to light. EOM are normal.   Neck: Normal range of motion. Neck supple.   Cardiovascular: Normal rate, regular rhythm and normal heart sounds.    Pulmonary/Chest: Effort normal and breath sounds normal.   Abdominal: Soft. Bowel sounds are normal.   Musculoskeletal: Normal range of motion.   Neurological: She is alert and oriented to person, place, and time. She displays abnormal reflex.   Hyper reflexia   Skin: Skin is warm and dry.   Psychiatric: She has a normal mood and affect. Her behavior is normal. Judgment and thought content normal.   Nursing note and vitals reviewed.        Assessment/Plan   Ena was seen today for new patient.    Diagnoses  and all orders for this visit:    Elevated blood pressure reading  Comments:  normalized     Chronic bilateral low back pain without sciatica  Comments:  improved post surgery    Thoracogenic scoliosis of thoracolumbar region  Comments:  doing physical therapy    Leukopenia, unspecified type  Comments:  will check a CBC today    Anemia, unspecified type  Comments:  will check a CBC    S/P lumbar laminectomy  Comments:  doing well     Depression, unspecified depression type  Comments:  well controlled    Asperger's disorder  Comments:  stable

## 2018-09-04 ENCOUNTER — TELEPHONE (OUTPATIENT)
Dept: INTERNAL MEDICINE | Facility: CLINIC | Age: 28
End: 2018-09-04

## 2018-09-04 NOTE — TELEPHONE ENCOUNTER
----- Message from Lashawn Rebolledo sent at 9/4/2018  3:13 PM EDT -----  Contact: Patient  Patient calling to get lab results. Please advise    Patient:[phone]

## 2018-09-13 RX ORDER — FLUOXETINE HYDROCHLORIDE 40 MG/1
CAPSULE ORAL
Qty: 30 CAPSULE | Refills: 2 | Status: SHIPPED | OUTPATIENT
Start: 2018-09-13 | End: 2019-01-14 | Stop reason: SDUPTHER

## 2018-09-28 DIAGNOSIS — G89.29 CHRONIC BILATERAL LOW BACK PAIN WITHOUT SCIATICA: Primary | ICD-10-CM

## 2018-09-28 DIAGNOSIS — M54.50 CHRONIC BILATERAL LOW BACK PAIN WITHOUT SCIATICA: Primary | ICD-10-CM

## 2018-10-19 ENCOUNTER — APPOINTMENT (OUTPATIENT)
Dept: PAIN MEDICINE | Facility: HOSPITAL | Age: 28
End: 2018-10-19

## 2018-12-19 RX ORDER — FLUOXETINE HYDROCHLORIDE 40 MG/1
CAPSULE ORAL
Qty: 30 CAPSULE | Refills: 1 | OUTPATIENT
Start: 2018-12-19

## 2018-12-28 RX ORDER — FLUOXETINE HYDROCHLORIDE 40 MG/1
CAPSULE ORAL
Qty: 30 CAPSULE | Refills: 0 | OUTPATIENT
Start: 2018-12-28

## 2019-01-11 RX ORDER — FLUOXETINE HYDROCHLORIDE 40 MG/1
CAPSULE ORAL
Qty: 30 CAPSULE | Refills: 1 | OUTPATIENT
Start: 2019-01-11

## 2019-01-14 ENCOUNTER — TELEPHONE (OUTPATIENT)
Dept: INTERNAL MEDICINE | Facility: CLINIC | Age: 29
End: 2019-01-14

## 2019-01-14 DIAGNOSIS — F39 MOOD DISORDER (HCC): Primary | ICD-10-CM

## 2019-01-14 RX ORDER — FLUOXETINE HYDROCHLORIDE 40 MG/1
40 CAPSULE ORAL DAILY
Qty: 30 CAPSULE | Refills: 0 | Status: SHIPPED | OUTPATIENT
Start: 2019-01-14 | End: 2019-02-10 | Stop reason: SDUPTHER

## 2019-02-04 RX ORDER — BUSPIRONE HYDROCHLORIDE 15 MG/1
TABLET ORAL
Qty: 90 TABLET | Refills: 2 | OUTPATIENT
Start: 2019-02-04

## 2019-02-10 DIAGNOSIS — F39 MOOD DISORDER (HCC): ICD-10-CM

## 2019-02-11 RX ORDER — FLUOXETINE HYDROCHLORIDE 40 MG/1
CAPSULE ORAL
Qty: 30 CAPSULE | Refills: 0 | Status: SHIPPED | OUTPATIENT
Start: 2019-02-11 | End: 2019-03-13 | Stop reason: SDUPTHER

## 2019-02-13 ENCOUNTER — OFFICE VISIT (OUTPATIENT)
Dept: INTERNAL MEDICINE | Facility: CLINIC | Age: 29
End: 2019-02-13

## 2019-02-13 VITALS
HEIGHT: 63 IN | BODY MASS INDEX: 33.66 KG/M2 | DIASTOLIC BLOOD PRESSURE: 78 MMHG | SYSTOLIC BLOOD PRESSURE: 118 MMHG | WEIGHT: 190 LBS

## 2019-02-13 DIAGNOSIS — G89.29 CHRONIC BILATERAL LOW BACK PAIN WITHOUT SCIATICA: ICD-10-CM

## 2019-02-13 DIAGNOSIS — I10 ESSENTIAL HYPERTENSION: Primary | ICD-10-CM

## 2019-02-13 DIAGNOSIS — M54.50 CHRONIC BILATERAL LOW BACK PAIN WITHOUT SCIATICA: ICD-10-CM

## 2019-02-13 DIAGNOSIS — F32.A DEPRESSION, UNSPECIFIED DEPRESSION TYPE: ICD-10-CM

## 2019-02-13 DIAGNOSIS — M41.35 THORACOGENIC SCOLIOSIS OF THORACOLUMBAR REGION: ICD-10-CM

## 2019-02-13 DIAGNOSIS — Z98.890 S/P LUMBAR LAMINECTOMY: ICD-10-CM

## 2019-02-13 PROCEDURE — 99214 OFFICE O/P EST MOD 30 MIN: CPT | Performed by: INTERNAL MEDICINE

## 2019-02-13 NOTE — PROGRESS NOTES
Subjective   Ena Lyon is a 28 y.o. female. Presents with a chief complaint of scoliosis, chronic low back pain, HTN, s/p lumbar fusion, depression, here for follow up and evaluation. We had a long discussion about the importance of PT at home on a daily basis and I recommended the [employer] book for her.    History of Present Illness all of the patient's issues are stable except for her scoliosis which remains and ongoing trial for her      The following portions of the patient's history were reviewed and updated as appropriate: allergies, current medications, past family history, past medical history, past social history, past surgical history and problem list.    Review of Systems   Constitutional: Negative.    HENT: Negative.    Eyes: Negative.    Respiratory: Negative.    Cardiovascular: Negative.    Gastrointestinal: Negative.    Endocrine: Negative.    Genitourinary: Negative.    Musculoskeletal: Positive for arthralgias, back pain and neck pain.   Skin: Negative.    Allergic/Immunologic: Negative.    Neurological: Negative.    Hematological: Negative.    Psychiatric/Behavioral: Positive for dysphoric mood.       Objective   Physical Exam   Constitutional: She is oriented to person, place, and time. She appears well-developed and well-nourished.   HENT:   Head: Normocephalic and atraumatic.   Eyes: Pupils are equal, round, and reactive to light.   Cardiovascular: Normal rate, regular rhythm and normal heart sounds.   Pulmonary/Chest: Effort normal and breath sounds normal.   Abdominal: Soft. Bowel sounds are normal.   Musculoskeletal:   Scoliosis with chronic back discomfort   Neurological: She is alert and oriented to person, place, and time.   Skin: Skin is warm.   Psychiatric: She has a normal mood and affect. Her behavior is normal. Thought content normal.   Nursing note and vitals reviewed.        Assessment/Plan   Ena was seen today for depression and elevated blood pressure reading.    Diagnoses and  all orders for this visit:    Essential hypertension  Comments:  well controlled    Thoracogenic scoliosis of thoracolumbar region  Comments:  working with a spine specialist    Chronic bilateral low back pain without sciatica  Comments:  working with PT at home    Depression, unspecified depression type  Comments:  well controlled    S/P lumbar laminectomy  Comments:  doing well

## 2019-03-13 DIAGNOSIS — F39 MOOD DISORDER (HCC): ICD-10-CM

## 2019-03-13 RX ORDER — FLUOXETINE HYDROCHLORIDE 40 MG/1
CAPSULE ORAL
Qty: 30 CAPSULE | Refills: 5 | Status: SHIPPED | OUTPATIENT
Start: 2019-03-13 | End: 2019-08-14 | Stop reason: SDUPTHER

## 2019-03-28 NOTE — TELEPHONE ENCOUNTER
----- Message from SARAH Calvillo sent at 3/26/2018  1:10 PM EDT -----  Regarding: RE: NEDA  Contact: [phone]  Recommend warm moist compresses 3-4 x daily. Styes will usually resolve on its own, but may need to be seen if persistent.  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, presented with elevation of PSA  CT scan chest 9/28/2018 showed involvement of the posterior side of the T10, left 7th rib area, involvement of the distal point of the right clavicle  He was on Firmagon per urology and  initiated on Lupron by Urology 2/12/2019      He had been on Xtandi (enzalutamide) 160 mg p o  Daily since 12/2018  His insurance did not approve it and he has been using samples  His insurance approved abiraterone however he had a high co-payment  We were finally able to get financial assistance through [employer]  He started Zytiga (abiraterone) 2/3/2019 with prednisone 5mg po bid without side effects           PSA 0 1 3/18/2019; 0 2 1/28/2019 compared to 9 8 10/2018  CBCD  Stable  Cr increased to 1 48 from 1 2  He is encouraged to remain hydrated  We reviewed strategies        Follow-up in 6-8 weeks with CBC, PSA, CMP      Zometa 4 mg IV every 3 months initiated 12/2018           HPI: 68year-old  male who was seen by Urology for elevation of the PSA, when he presented in June 2017 with PSA of 4 6, Subsequently in September of 2017 PSA was 7 3 and in May of 2018 PSA was 8 6  A biopsy was recommended but the patient had just undergone percutaneous stenting of the heart and he was on dual anti-platelet therapy      MRI of the abdomen in April 2018 showed 2 simple cyst in the right hepatic lobe, bilateral renal cysts the largest 1 measuring 10 cm in the left lower lobe     In October of 2018 PSA was 9 8  CT scan of the chest 9/28/2018 showed sclerotic lesions in T10, 5 mm nodule in the right lower lobe    Bone scan showed activity in the posterior T10 level and left posterior 7th rib area concerning for osseous metastases and increased activity in the distal right clavicle might be degenerative or metastatic area  He was diagnosed with castration sensitive metastatic prostate cancer  His insurance company could not approve enzalutamide, he also has a high co-payment for abiraterone     The patient initiated on samples of enzalutamide 160 mg p o  daily since the beginning of December 2018  His insurance approved abiraterone however he had a high co-payment  We were finally able to get financial assistance through [employer] [place]  He started Zytiga (abiraterone) 2/3/2019 with prednisone 5mg po bid without side effects  Interval History:  Feels well  Denies any complaints other than intermittent hot flashes  Works out daily  Test Results:        Labs:   Lab Results   Component Value Date    HGB 14 7 03/18/2019    HCT 42 7 03/18/2019    MCV 92 2 03/18/2019     03/18/2019    WBC 6 8 03/18/2019     Lab Results   Component Value Date     09/30/2017    K 3 9 03/18/2019     03/18/2019    CO2 30 03/18/2019    BUN 26 (H) 03/18/2019    CREATININE 1 20 12/11/2018    CALCIUM 9 1 03/18/2019    AST 19 03/18/2019    ALT 16 03/18/2019    ALKPHOS 44 03/18/2019    PROT 6 5 09/30/2017    BILITOT 0 7 09/30/2017    EGFR 58 12/11/2018       Imaging: No results found  ROS:  As mentioned in HPI & Interval History otherwise 14 point ROS negative          Allergies: No Known Allergies  Current Medications: Reviewed  PMH/FH/SH:  Reviewed      Physical Exam:    2 meters squared    Ht Readings from Last 3 Encounters:   03/28/19 5' 7" (1 702 m)   03/05/19 5' 7" (1 702 m)   02/14/19 5' 6 75" (1 695 m)        Wt Readings from Last 3 Encounters:   03/28/19 88 kg (194 lb)   03/05/19 88 kg (194 lb 0 1 oz)   02/14/19 86 6 kg (191 lb)        Temp Readings from Last 3 Encounters:   03/28/19 98 4 °F (36 9 °C) (Tympanic)   03/05/19 (!) 96 9 °F (36 1 °C) (Tympanic)   02/14/19 97 9 °F (36 6 °C) (Tympanic)        BP Readings from Last 3 Encounters:   19 160/80   19 133/83   19 129/71           Physical Exam   Constitutional: He is oriented to person, place, and time  He appears well-developed and well-nourished  No distress  HENT:   Head: Normocephalic and atraumatic  Eyes: Pupils are equal, round, and reactive to light  Conjunctivae and EOM are normal    Neck: Normal range of motion  Neck supple  No tracheal deviation present  Cardiovascular: Normal rate and regular rhythm  Exam reveals no gallop and no friction rub  No murmur heard  Pulmonary/Chest: Effort normal and breath sounds normal  No respiratory distress  He has no wheezes  He has no rales  He exhibits no tenderness  Abdominal: Soft  Bowel sounds are normal  He exhibits no distension and no mass  There is no tenderness  There is no rebound and no guarding  Musculoskeletal: Normal range of motion  Lymphadenopathy:     He has no cervical adenopathy  Neurological: He is alert and oriented to person, place, and time  Skin: Skin is warm and dry  No rash noted  He is not diaphoretic  No erythema  No pallor  Psychiatric: He has a normal mood and affect  His behavior is normal  Judgment and thought content normal    Vitals reviewed        ECO      Emergency Contacts:    Royer Malcolm, [phone],

## 2019-05-31 ENCOUNTER — OFFICE VISIT (OUTPATIENT)
Dept: INTERNAL MEDICINE | Facility: CLINIC | Age: 29
End: 2019-05-31

## 2019-05-31 VITALS
DIASTOLIC BLOOD PRESSURE: 82 MMHG | OXYGEN SATURATION: 98 % | SYSTOLIC BLOOD PRESSURE: 128 MMHG | TEMPERATURE: 98.7 F | WEIGHT: 189 LBS | HEART RATE: 95 BPM | BODY MASS INDEX: 33.48 KG/M2

## 2019-05-31 DIAGNOSIS — J01.90 ACUTE NON-RECURRENT SINUSITIS, UNSPECIFIED LOCATION: Primary | ICD-10-CM

## 2019-05-31 DIAGNOSIS — R05.9 COUGH: ICD-10-CM

## 2019-05-31 PROCEDURE — 99213 OFFICE O/P EST LOW 20 MIN: CPT | Performed by: NURSE PRACTITIONER

## 2019-05-31 RX ORDER — BENZONATATE 200 MG/1
200 CAPSULE ORAL 3 TIMES DAILY PRN
Qty: 30 CAPSULE | Refills: 0 | Status: SHIPPED | OUTPATIENT
Start: 2019-05-31 | End: 2019-07-24

## 2019-05-31 RX ORDER — AZITHROMYCIN 250 MG/1
TABLET, FILM COATED ORAL
Qty: 6 TABLET | Refills: 0 | Status: SHIPPED | OUTPATIENT
Start: 2019-05-31 | End: 2019-06-06

## 2019-05-31 NOTE — PATIENT INSTRUCTIONS
Can take Mucinex, Delsym OTC. Also get Flonase and an allergy antihistamine. Increase fluid intake and rest.

## 2019-05-31 NOTE — PROGRESS NOTES
Subjective   Ena Lyon is a 28 y.o. female.     She presents with congestion, cough, body aches, h/a x 5 days. Her roommate has been sick.      URI    This is a new problem. The current episode started in the past 7 days. The problem has been gradually worsening. Maximum temperature: she felt feverish but did not measure. Associated symptoms include chest pain (r/t cough and deep breathing), congestion, coughing (productive), ear pain, headaches, a plugged ear sensation, sinus pain, sneezing, a sore throat and swollen glands. Pertinent negatives include no abdominal pain, nausea or vomiting. Associated symptoms comments: Jaws and eyes hurt. She has tried NSAIDs for the symptoms. The treatment provided no relief.        The following portions of the patient's history were reviewed and updated as appropriate: allergies, current medications, past family history, past medical history, past social history, past surgical history and problem list.    Review of Systems   Constitutional: Positive for chills and fatigue.   HENT: Positive for congestion, ear pain, postnasal drip, sinus pressure, sinus pain, sneezing and sore throat.    Respiratory: Positive for cough (productive) and shortness of breath.    Cardiovascular: Positive for chest pain (r/t cough and deep breathing).   Gastrointestinal: Negative for abdominal pain, nausea and vomiting.   Musculoskeletal: Positive for myalgias.   Neurological: Positive for headaches.       Objective   Physical Exam   Constitutional: She appears well-developed and well-nourished. No distress.   HENT:   Head: Normocephalic and atraumatic.   Right Ear: Hearing normal.   Left Ear: Hearing normal.   Nose: Mucosal edema and rhinorrhea present. Right sinus exhibits maxillary sinus tenderness and frontal sinus tenderness. Left sinus exhibits maxillary sinus tenderness and frontal sinus tenderness.   Mouth/Throat: Uvula is midline and mucous membranes are normal. Posterior oropharyngeal  erythema (clear hypersecretions noted) present. No oropharyngeal exudate. No tonsillar exudate.   Unable to visualize TMs due to cerumen   Eyes: Conjunctivae are normal. Right eye exhibits no discharge. Left eye exhibits no discharge.   Cardiovascular: Normal rate, regular rhythm and normal heart sounds.   No murmur heard.  Pulmonary/Chest: Effort normal and breath sounds normal. No tachypnea. She has no wheezes.   Lymphadenopathy:     She has no cervical adenopathy.   Neurological: She is alert.   Skin: She is not diaphoretic.   Psychiatric: She has a normal mood and affect.   Vitals reviewed.      Assessment/Plan   Ena was seen today for uri, generalized body aches and fever.    Diagnoses and all orders for this visit:    Acute non-recurrent sinusitis, unspecified location  -     azithromycin (ZITHROMAX Z-CHEMO) 250 MG tablet; Take 2 tablets the first day, then 1 tablet daily for 4 days.    Cough  -     benzonatate (TESSALON) 200 MG capsule; Take 1 capsule by mouth 3 (Three) Times a Day As Needed for Cough.    Only take tessalon at before bed unless cough becomes dry.     Take Mucinex, allergy antihistamine, fluticasone nasal spray (all OTC) as directed.    Supportive therapy with antipyretics, analgesics, increased rest and fluids, cold/soft foods, throat lozenges, and salt water gargles.    Return for worsening of sx.

## 2019-07-24 ENCOUNTER — OFFICE VISIT (OUTPATIENT)
Dept: INTERNAL MEDICINE | Facility: CLINIC | Age: 29
End: 2019-07-24

## 2019-07-24 VITALS
BODY MASS INDEX: 34.19 KG/M2 | SYSTOLIC BLOOD PRESSURE: 130 MMHG | OXYGEN SATURATION: 97 % | TEMPERATURE: 98.3 F | HEART RATE: 77 BPM | WEIGHT: 193 LBS | DIASTOLIC BLOOD PRESSURE: 80 MMHG

## 2019-07-24 DIAGNOSIS — R06.02 SHORTNESS OF BREATH: ICD-10-CM

## 2019-07-24 DIAGNOSIS — J01.90 ACUTE NON-RECURRENT SINUSITIS, UNSPECIFIED LOCATION: Primary | ICD-10-CM

## 2019-07-24 DIAGNOSIS — R05.9 COUGH: ICD-10-CM

## 2019-07-24 DIAGNOSIS — J02.9 SORE THROAT: ICD-10-CM

## 2019-07-24 LAB
EXPIRATION DATE: NORMAL
INTERNAL CONTROL: NORMAL
Lab: NORMAL
S PYO AG THROAT QL: NEGATIVE

## 2019-07-24 PROCEDURE — 87880 STREP A ASSAY W/OPTIC: CPT | Performed by: NURSE PRACTITIONER

## 2019-07-24 PROCEDURE — 99213 OFFICE O/P EST LOW 20 MIN: CPT | Performed by: NURSE PRACTITIONER

## 2019-07-24 RX ORDER — ALBUTEROL SULFATE 90 UG/1
2 AEROSOL, METERED RESPIRATORY (INHALATION) EVERY 4 HOURS PRN
Qty: 1 INHALER | Refills: 5 | Status: SHIPPED | OUTPATIENT
Start: 2019-07-24 | End: 2019-08-14

## 2019-07-24 RX ORDER — AZITHROMYCIN 250 MG/1
TABLET, FILM COATED ORAL
Qty: 6 TABLET | Refills: 0 | Status: SHIPPED | OUTPATIENT
Start: 2019-07-24 | End: 2019-07-30

## 2019-07-24 RX ORDER — BENZONATATE 200 MG/1
200 CAPSULE ORAL 3 TIMES DAILY PRN
Qty: 30 CAPSULE | Refills: 0 | Status: SHIPPED | OUTPATIENT
Start: 2019-07-24 | End: 2019-08-14

## 2019-07-24 NOTE — PROGRESS NOTES
Subjective   Ena Lyon is a 29 y.o. female.     X 5 days.      URI    This is a new problem. The current episode started in the past 7 days. The problem has been unchanged. There has been no fever. Associated symptoms include congestion, coughing (dry and productive), headaches, a plugged ear sensation, rhinorrhea, sinus pain, sneezing, a sore throat and swollen glands. Pertinent negatives include no abdominal pain, chest pain, nausea or vomiting. She has tried increased fluids for the symptoms. The treatment provided no relief.   Sore Throat    This is a new problem. The current episode started in the past 7 days. The problem has been unchanged. There has been no fever. Associated symptoms include congestion, coughing (dry and productive), headaches, a plugged ear sensation, shortness of breath (due to tightness and congestion) and swollen glands. Pertinent negatives include no abdominal pain or vomiting. She has had exposure to strep.        The following portions of the patient's history were reviewed and updated as appropriate: allergies, current medications, past family history, past medical history, past social history, past surgical history and problem list.    Review of Systems   Constitutional: Positive for fatigue. Negative for fever.   HENT: Positive for congestion, postnasal drip, rhinorrhea, sinus pressure, sinus pain, sneezing and sore throat.    Respiratory: Positive for cough (dry and productive), chest tightness and shortness of breath (due to tightness and congestion).    Cardiovascular: Negative for chest pain.   Gastrointestinal: Negative for abdominal pain, nausea and vomiting.   Neurological: Positive for headaches.       Objective   Physical Exam   Constitutional: She appears well-developed and well-nourished. No distress.   HENT:   Head: Normocephalic and atraumatic.   Right Ear: Hearing and tympanic membrane normal.   Left Ear: Hearing normal.   Nose: No mucosal edema or rhinorrhea.  Right sinus exhibits maxillary sinus tenderness. Right sinus exhibits no frontal sinus tenderness. Left sinus exhibits maxillary sinus tenderness. Left sinus exhibits no frontal sinus tenderness.   Mouth/Throat: Uvula is midline and mucous membranes are normal. Posterior oropharyngeal erythema (clear hypersecretions noted) present. No oropharyngeal exudate. No tonsillar exudate.   Unable to visualize L TM due to cerumen   Eyes: Conjunctivae are normal. Right eye exhibits no discharge. Left eye exhibits no discharge.   Cardiovascular: Normal rate, regular rhythm and normal heart sounds.   No murmur heard.  Pulmonary/Chest: Effort normal and breath sounds normal. No tachypnea. No respiratory distress. She has no wheezes. She has no rales.   Lymphadenopathy:        Head (right side): No submental, no submandibular and no tonsillar adenopathy present.        Head (left side): No submental, no submandibular and no tonsillar adenopathy present.   Tenderness upon palpation to tonsillar nodes.   Neurological: She is alert.   Skin: She is not diaphoretic.   Psychiatric: She has a normal mood and affect.   Vitals reviewed.      Assessment/Plan   Ena was seen today for uri and sore throat.    Diagnoses and all orders for this visit:    Acute non-recurrent sinusitis, unspecified location  -     azithromycin (ZITHROMAX Z-CHEMO) 250 MG tablet; Take 2 tablets the first day, then 1 tablet daily for 4 days.    Sore throat  -     Cancel: POCT rapid strep A  -     POC Rapid Strep A    Shortness of breath  -     albuterol sulfate HFA (VENTOLIN HFA) 108 (90 Base) MCG/ACT inhaler; Inhale 2 puffs Every 4 (Four) Hours As Needed for Wheezing.    Cough  -     benzonatate (TESSALON) 200 MG capsule; Take 1 capsule by mouth 3 (Three) Times a Day As Needed for Cough.    Rapid strep-negative, discussed with pt.    Educated on inhaler use.    She will increase her fluids and rest. She will take the rest of the day off. She can return to work  tomorrow.    Return for worsening of sx.

## 2019-08-14 ENCOUNTER — OFFICE VISIT (OUTPATIENT)
Dept: INTERNAL MEDICINE | Facility: CLINIC | Age: 29
End: 2019-08-14

## 2019-08-14 VITALS
WEIGHT: 192 LBS | DIASTOLIC BLOOD PRESSURE: 88 MMHG | HEIGHT: 63 IN | SYSTOLIC BLOOD PRESSURE: 136 MMHG | BODY MASS INDEX: 34.02 KG/M2

## 2019-08-14 DIAGNOSIS — Z98.890 S/P LUMBAR LAMINECTOMY: ICD-10-CM

## 2019-08-14 DIAGNOSIS — M41.35 THORACOGENIC SCOLIOSIS OF THORACOLUMBAR REGION: ICD-10-CM

## 2019-08-14 DIAGNOSIS — M54.50 CHRONIC BILATERAL LOW BACK PAIN WITHOUT SCIATICA: Primary | ICD-10-CM

## 2019-08-14 DIAGNOSIS — F39 MOOD DISORDER (HCC): ICD-10-CM

## 2019-08-14 DIAGNOSIS — G89.29 CHRONIC BILATERAL LOW BACK PAIN WITHOUT SCIATICA: Primary | ICD-10-CM

## 2019-08-14 DIAGNOSIS — F32.A DEPRESSION, UNSPECIFIED DEPRESSION TYPE: ICD-10-CM

## 2019-08-14 PROCEDURE — 99214 OFFICE O/P EST MOD 30 MIN: CPT | Performed by: INTERNAL MEDICINE

## 2019-08-14 RX ORDER — FLUOXETINE HYDROCHLORIDE 40 MG/1
40 CAPSULE ORAL DAILY
Qty: 30 CAPSULE | Refills: 5 | Status: SHIPPED | OUTPATIENT
Start: 2019-08-14 | End: 2020-03-31

## 2019-08-14 NOTE — PROGRESS NOTES
Subjective   Ena Lyon is a 29 y.o. female. Presents with a chief complaint of acute on chronic low back pain, depression, s/p lumbar laminectomy, here for follow up and evaluation. I am recommending PT follow up.  All of her medications are working well.    History of Present Illness recent onset recurrence of her low back pain    The following portions of the patient's history were reviewed and updated as appropriate: allergies, current medications, past family history, past medical history, past social history, past surgical history and problem list.    Review of Systems   Constitutional: Positive for fatigue.   HENT: Negative.    Eyes: Negative.    Cardiovascular: Negative.    Gastrointestinal: Negative.    Endocrine: Negative.    Genitourinary: Negative.    Musculoskeletal: Positive for arthralgias and back pain.   Skin: Negative.    Neurological: Negative.    Hematological: Negative.    Psychiatric/Behavioral: Negative.        Objective   Physical Exam   Constitutional: She is oriented to person, place, and time. She appears well-developed and well-nourished.   HENT:   Head: Normocephalic and atraumatic.   Eyes: EOM are normal. Pupils are equal, round, and reactive to light.   Neck: Normal range of motion. Neck supple.   Cardiovascular: Normal rate, regular rhythm and normal heart sounds.   Pulmonary/Chest: Effort normal and breath sounds normal.   Abdominal: Soft. Bowel sounds are normal.   Musculoskeletal:   Chronic low back pain with recent exacerbation   Neurological: She is alert and oriented to person, place, and time.   Psychiatric: She has a normal mood and affect.   Nursing note and vitals reviewed.        Assessment/Plan   Ena was seen today for hypertension.    Diagnoses and all orders for this visit:    Chronic bilateral low back pain without sciatica  Comments:  Send patient to physical therapy  Orders:  -     Ambulatory Referral to Physical Therapy    Thoracogenic scoliosis of thoracolumbar  region  Comments:  Physical therapy    S/P lumbar laminectomy  Comments:  Appears to be stable at this time    Depression, unspecified depression type  Comments:  Doing well on Prozac    Mood disorder (CMS/[facility])  Comments:  Continue cognitive behavioral therapy with her therapist  Orders:  -     FLUoxetine (PROzac) 40 MG capsule; Take 1 capsule by mouth Daily.

## 2020-03-31 DIAGNOSIS — F39 MOOD DISORDER (HCC): ICD-10-CM

## 2020-03-31 RX ORDER — FLUOXETINE HYDROCHLORIDE 40 MG/1
CAPSULE ORAL
Qty: 30 CAPSULE | Refills: 0 | Status: SHIPPED | OUTPATIENT
Start: 2020-03-31 | End: 2020-05-01

## 2020-05-01 DIAGNOSIS — F39 MOOD DISORDER (HCC): ICD-10-CM

## 2020-05-01 RX ORDER — FLUOXETINE HYDROCHLORIDE 40 MG/1
CAPSULE ORAL
Qty: 30 CAPSULE | Refills: 0 | Status: SHIPPED | OUTPATIENT
Start: 2020-05-01 | End: 2020-05-28

## 2020-05-27 DIAGNOSIS — F39 MOOD DISORDER (HCC): ICD-10-CM

## 2020-05-28 RX ORDER — FLUOXETINE HYDROCHLORIDE 40 MG/1
CAPSULE ORAL
Qty: 30 CAPSULE | Refills: 0 | Status: SHIPPED | OUTPATIENT
Start: 2020-05-28 | End: 2020-07-06

## 2020-06-30 NOTE — TELEPHONE ENCOUNTER
----- Message from Linda Alcantar sent at 1/14/2019 10:36 AM EST -----  Contact: pt - Dr FRANKLIN's pt - RE: script  Pt calling and would like a refill on Rx      FLUoxetine (PROzac) 40 MG capsule 30 capsule    Sig: TAKE ONE CAPSULE BY MOUTH DAILY     [facility] [address] - [phone] [facility] [phone] FX      Pt # [phone]   6 month check  Had recent lab to review.  Medications verified, no changes.  Denies known Latex allergy or symptoms of Latex sensitivity.

## 2020-07-04 DIAGNOSIS — F39 MOOD DISORDER (HCC): ICD-10-CM

## 2020-07-06 RX ORDER — FLUOXETINE HYDROCHLORIDE 40 MG/1
CAPSULE ORAL
Qty: 30 CAPSULE | Refills: 6 | Status: SHIPPED | OUTPATIENT
Start: 2020-07-06 | End: 2021-03-03 | Stop reason: SDUPTHER

## 2020-09-08 ENCOUNTER — OFFICE VISIT (OUTPATIENT)
Dept: INTERNAL MEDICINE | Facility: CLINIC | Age: 30
End: 2020-09-08

## 2020-09-08 VITALS
HEART RATE: 77 BPM | SYSTOLIC BLOOD PRESSURE: 130 MMHG | OXYGEN SATURATION: 99 % | HEIGHT: 64 IN | WEIGHT: 203 LBS | TEMPERATURE: 98 F | DIASTOLIC BLOOD PRESSURE: 84 MMHG | BODY MASS INDEX: 34.66 KG/M2

## 2020-09-08 DIAGNOSIS — Z00.00 ANNUAL PHYSICAL EXAM: ICD-10-CM

## 2020-09-08 DIAGNOSIS — Z98.890 S/P LUMBAR LAMINECTOMY: ICD-10-CM

## 2020-09-08 DIAGNOSIS — N94.6 DYSMENORRHEA: ICD-10-CM

## 2020-09-08 DIAGNOSIS — I10 ESSENTIAL HYPERTENSION: Primary | ICD-10-CM

## 2020-09-08 LAB
ALBUMIN SERPL-MCNC: 4.9 G/DL (ref 3.5–5.2)
ALBUMIN/GLOB SERPL: 2.5 G/DL
ALP SERPL-CCNC: 78 U/L (ref 39–117)
ALT SERPL-CCNC: 23 U/L (ref 1–33)
AST SERPL-CCNC: 23 U/L (ref 1–32)
BASOPHILS # BLD AUTO: 0.05 10*3/MM3 (ref 0–0.2)
BASOPHILS NFR BLD AUTO: 1.2 % (ref 0–1.5)
BILIRUB SERPL-MCNC: 0.6 MG/DL (ref 0–1.2)
BUN SERPL-MCNC: 11 MG/DL (ref 6–20)
BUN/CREAT SERPL: 12.6 (ref 7–25)
CALCIUM SERPL-MCNC: 9.5 MG/DL (ref 8.6–10.5)
CHLORIDE SERPL-SCNC: 102 MMOL/L (ref 98–107)
CHOLEST SERPL-MCNC: 151 MG/DL (ref 0–200)
CO2 SERPL-SCNC: 27.5 MMOL/L (ref 22–29)
CREAT SERPL-MCNC: 0.87 MG/DL (ref 0.57–1)
EOSINOPHIL # BLD AUTO: 0.12 10*3/MM3 (ref 0–0.4)
EOSINOPHIL NFR BLD AUTO: 2.8 % (ref 0.3–6.2)
ERYTHROCYTE [DISTWIDTH] IN BLOOD BY AUTOMATED COUNT: 13.1 % (ref 12.3–15.4)
GLOBULIN SER CALC-MCNC: 2 GM/DL
GLUCOSE SERPL-MCNC: 92 MG/DL (ref 65–99)
HCT VFR BLD AUTO: 38.6 % (ref 34–46.6)
HDLC SERPL-MCNC: 67 MG/DL (ref 40–60)
HGB BLD-MCNC: 12.5 G/DL (ref 12–15.9)
IMM GRANULOCYTES # BLD AUTO: 0 10*3/MM3 (ref 0–0.05)
IMM GRANULOCYTES NFR BLD AUTO: 0 % (ref 0–0.5)
LDLC SERPL CALC-MCNC: 73 MG/DL (ref 0–100)
LDLC/HDLC SERPL: 1.09 {RATIO}
LYMPHOCYTES # BLD AUTO: 1.05 10*3/MM3 (ref 0.7–3.1)
LYMPHOCYTES NFR BLD AUTO: 24.9 % (ref 19.6–45.3)
MCH RBC QN AUTO: 29.7 PG (ref 26.6–33)
MCHC RBC AUTO-ENTMCNC: 32.4 G/DL (ref 31.5–35.7)
MCV RBC AUTO: 91.7 FL (ref 79–97)
MONOCYTES # BLD AUTO: 0.53 10*3/MM3 (ref 0.1–0.9)
MONOCYTES NFR BLD AUTO: 12.6 % (ref 5–12)
NEUTROPHILS # BLD AUTO: 2.47 10*3/MM3 (ref 1.7–7)
NEUTROPHILS NFR BLD AUTO: 58.5 % (ref 42.7–76)
NRBC BLD AUTO-RTO: 0 /100 WBC (ref 0–0.2)
PLATELET # BLD AUTO: 222 10*3/MM3 (ref 140–450)
POTASSIUM SERPL-SCNC: 4.3 MMOL/L (ref 3.5–5.2)
PROT SERPL-MCNC: 6.9 G/DL (ref 6–8.5)
RBC # BLD AUTO: 4.21 10*6/MM3 (ref 3.77–5.28)
SODIUM SERPL-SCNC: 139 MMOL/L (ref 136–145)
T4 SERPL-MCNC: 6.28 MCG/DL (ref 4.5–11.7)
TRIGL SERPL-MCNC: 56 MG/DL (ref 0–150)
TSH SERPL DL<=0.005 MIU/L-ACNC: 2.46 UIU/ML (ref 0.27–4.2)
VLDLC SERPL CALC-MCNC: 11.2 MG/DL
WBC # BLD AUTO: 4.22 10*3/MM3 (ref 3.4–10.8)

## 2020-09-08 PROCEDURE — 99395 PREV VISIT EST AGE 18-39: CPT | Performed by: INTERNAL MEDICINE

## 2020-09-08 NOTE — PROGRESS NOTES
"Subjective   Ena Lyon is a 30 y.o. female.  Patient presents with chief complaint of essential hypertension that is moderately well controlled, cystic acne, dysmenorrhea, status post lumbar laminectomy with moderate to mixed results, here for general physical exam.  She is in need of a gynecologist and I have made a referral for her to do so and she has a dermatologist for which she checks in with routinely regarding her cystic acne.  We had a good discussion regarding COVID pandemic precautions such as constant mask wearing in public, frequent handwashing, and appropriate social distancing, along with getting the flu shot as soon as it is available to her.      /84 (BP Location: Right arm, Patient Position: Sitting, Cuff Size: Adult)   Pulse 77   Temp 98 °F (36.7 °C)   Ht 161.3 cm (63.5\")   Wt 92.1 kg (203 lb)   SpO2 99%   BMI 35.40 kg/m²     Body mass index is 35.4 kg/m².    History of Present Illness chronic moderate low back pain secondary to advanced lumbar disc disease    The following portions of the patient's history were reviewed and updated as appropriate: allergies, current medications, past family history, past medical history, past social history, past surgical history and problem list.    Review of Systems   Constitutional: Negative.    HENT: Negative.    Respiratory: Negative.    Cardiovascular: Negative.    Gastrointestinal: Negative.    Musculoskeletal: Positive for arthralgias and back pain.   Skin:        Cystic acne, very fair skin with multiple pigmented nevi   Neurological: Negative.    Psychiatric/Behavioral: Negative.        Objective   Physical Exam   Constitutional: She is oriented to person, place, and time. She appears well-developed and well-nourished.   HENT:   Head: Normocephalic and atraumatic.   Eyes: Pupils are equal, round, and reactive to light. EOM are normal.   Cardiovascular: Normal rate, regular rhythm and normal heart sounds.   Pulmonary/Chest: Effort normal " and breath sounds normal.   Abdominal: Soft. Bowel sounds are normal.   Musculoskeletal:   Status post lumbar laminectomy with moderate low back pain   Neurological: She is alert and oriented to person, place, and time.   Skin:   Cystic acne, very fair skin multiple pigmented nevi   Psychiatric: She has a normal mood and affect. Her behavior is normal.   Nursing note and vitals reviewed.        Assessment/Plan   Ena was seen today for annual exam.    Diagnoses and all orders for this visit:    Essential hypertension  Comments:  Well-controlled no changes at this time    Dysmenorrhea  Comments:  Gynecology follow-up  Orders:  -     Ambulatory Referral to Gynecology    S/P lumbar laminectomy  Comments:  Well-healed with moderate to modest low back pain on a daily basis    Annual physical exam  Comments:  I am going to do a full set of labs today pertinent to the patient's age and gender  Orders:  -     Comprehensive metabolic panel; Future  -     Lipid Panel With LDL/HDL Ratio; Future  -     CBC w AUTO Differential; Future  -     TSH; Future  -     T4; Future

## 2020-09-23 ENCOUNTER — TELEPHONE (OUTPATIENT)
Dept: INTERNAL MEDICINE | Facility: CLINIC | Age: 30
End: 2020-09-23

## 2020-09-23 NOTE — TELEPHONE ENCOUNTER
PATIENT IS CALLING TO SCHEDULE A HOSPITAL FOLLOW UP. SHE WAS SEEN ON 9/19/2020 AND DISCHARGED THE SAME DAY. SHE WAS TOLD T FOLLOW UP WITH PCP. SHE WENT TO [facility].    [facility] PH#: [phone]

## 2020-09-29 ENCOUNTER — OFFICE VISIT (OUTPATIENT)
Dept: INTERNAL MEDICINE | Facility: CLINIC | Age: 30
End: 2020-09-29

## 2020-09-29 VITALS
TEMPERATURE: 97.7 F | OXYGEN SATURATION: 99 % | BODY MASS INDEX: 34.49 KG/M2 | HEART RATE: 88 BPM | DIASTOLIC BLOOD PRESSURE: 80 MMHG | HEIGHT: 64 IN | WEIGHT: 202 LBS | SYSTOLIC BLOOD PRESSURE: 128 MMHG

## 2020-09-29 DIAGNOSIS — L03.115 CELLULITIS OF RIGHT LOWER EXTREMITY: Primary | ICD-10-CM

## 2020-09-29 PROCEDURE — 99213 OFFICE O/P EST LOW 20 MIN: CPT | Performed by: NURSE PRACTITIONER

## 2020-09-29 RX ORDER — SULFAMETHOXAZOLE AND TRIMETHOPRIM 800; 160 MG/1; MG/1
TABLET ORAL
COMMUNITY
Start: 2020-09-19 | End: 2021-05-14

## 2020-09-29 RX ORDER — CEPHALEXIN 500 MG/1
CAPSULE ORAL
COMMUNITY
Start: 2020-09-19 | End: 2021-05-14

## 2020-09-29 NOTE — PROGRESS NOTES
Subjective   Ena Lyon is a 30 y.o. female.     She went to [facility] on 9/19/2020 secondary to right leg rash and tenderness. She was treated for cellulitis secondary to bug bite. She has been taking keflex and bactrim and has about 1.5 days left. She reports significant improvement in symptoms.     Rash  This is a new problem. The current episode started 1 to 4 weeks ago. The problem has been gradually worsening since onset. Location: right lower leg  She was exposed to an insect bite/sting. Pertinent negatives include no cough, diarrhea, eye pain, fatigue, fever, joint pain, rhinorrhea, shortness of breath or sore throat. Past treatments include antibiotics. The treatment provided significant relief.        The following portions of the patient's history were reviewed and updated as appropriate: allergies, current medications, past family history, past medical history, past social history, past surgical history and problem list.    Review of Systems   Constitutional: Negative for activity change, appetite change, fatigue and fever.   HENT: Negative for rhinorrhea and sore throat.    Eyes: Negative for pain.   Respiratory: Negative for cough, shortness of breath and wheezing.    Cardiovascular: Negative for chest pain, palpitations and leg swelling (resolved ).   Gastrointestinal: Negative for diarrhea.   Musculoskeletal: Negative for joint pain.   Skin: Positive for rash (resolved ). Negative for wound.       Objective   Physical Exam  Constitutional:       Appearance: She is well-developed.   HENT:      Head: Normocephalic.      Nose: Nose normal.   Cardiovascular:      Rate and Rhythm: Regular rhythm.      Heart sounds: Normal heart sounds. No murmur. No S3 or S4 sounds.    Pulmonary:      Effort: Pulmonary effort is normal.      Breath sounds: Normal breath sounds. No decreased breath sounds, wheezing, rhonchi or rales.   Musculoskeletal:      Right lower leg: No edema.      Left lower leg: No edema.    Skin:     General: Skin is warm and dry.      Findings: No erythema, laceration or rash.      Comments: No redness, warmth or streaking   Neurological:      Mental Status: She is alert and oriented to person, place, and time.      Gait: Gait normal.         Assessment/Plan   Ena was seen today for hospital follow up visit.    Diagnoses and all orders for this visit:    Cellulitis of right lower extremity  Comments:  resolved, completed keflex and bactrim; return if any worsening of symptoms       I reviewed recent ED notes from [facility]  I also reviewed recent health maintanece per her request.

## 2021-03-03 DIAGNOSIS — F39 MOOD DISORDER (HCC): ICD-10-CM

## 2021-03-03 NOTE — TELEPHONE ENCOUNTER
Caller: Ena Lyon    Relationship: Self    Best call back number: [phone]     Medication needed:   Requested Prescriptions     Pending Prescriptions Disp Refills   • FLUoxetine (PROzac) 40 MG capsule 30 capsule 6     Sig: Take 1 capsule by mouth Daily.       When do you need the refill by: 03/04/2021    What details did the patient provide when requesting the medication: PATIENT HAS ABOUT 2 WEEKS LEFT ON THIS HAS NEW PATIENT APPT IN MAY WITH ANOTHER PROVIDER BUT WILL BE OUT BEFORE THEN.    Does the patient have less than a 3 day supply:  [] Yes  [x] No    What is the patient's preferred pharmacy:    [address] - [phone]  - [phone] FX

## 2021-03-04 RX ORDER — FLUOXETINE HYDROCHLORIDE 40 MG/1
40 CAPSULE ORAL DAILY
Qty: 30 CAPSULE | Refills: 2 | Status: SHIPPED | OUTPATIENT
Start: 2021-03-04 | End: 2021-06-21 | Stop reason: SDUPTHER

## 2021-05-14 ENCOUNTER — OFFICE VISIT (OUTPATIENT)
Dept: FAMILY MEDICINE CLINIC | Facility: CLINIC | Age: 31
End: 2021-05-14

## 2021-05-14 VITALS
OXYGEN SATURATION: 99 % | DIASTOLIC BLOOD PRESSURE: 78 MMHG | WEIGHT: 196.3 LBS | HEIGHT: 64 IN | HEART RATE: 91 BPM | SYSTOLIC BLOOD PRESSURE: 114 MMHG | TEMPERATURE: 97.1 F | BODY MASS INDEX: 33.51 KG/M2

## 2021-05-14 DIAGNOSIS — R14.0 ABDOMINAL BLOATING: Primary | ICD-10-CM

## 2021-05-14 DIAGNOSIS — R10.9 ABDOMINAL CRAMPING: ICD-10-CM

## 2021-05-14 DIAGNOSIS — K62.5 RECTAL BLEEDING: ICD-10-CM

## 2021-05-14 DIAGNOSIS — Z11.59 ENCOUNTER FOR HEPATITIS C SCREENING TEST FOR LOW RISK PATIENT: ICD-10-CM

## 2021-05-14 PROCEDURE — 99214 OFFICE O/P EST MOD 30 MIN: CPT | Performed by: FAMILY MEDICINE

## 2021-05-14 RX ORDER — NORETHINDRONE ACETATE AND ETHINYL ESTRADIOL, AND FERROUS FUMARATE 1MG-20(24)
KIT ORAL
COMMUNITY
Start: 2021-04-30 | End: 2023-03-06 | Stop reason: ALTCHOICE

## 2021-05-14 RX ORDER — BUSPIRONE HYDROCHLORIDE 10 MG/1
10 TABLET ORAL 3 TIMES DAILY
Qty: 90 TABLET | Refills: 3 | Status: SHIPPED | OUTPATIENT
Start: 2021-05-14 | End: 2021-08-17

## 2021-05-14 NOTE — PROGRESS NOTES
Chief Complaint  Establish Care (NP to [place])    Subjective          Ena Lyon presents to [facility] PRIMARY CARE  History of Present Illness  Chronic low back pain  Better right now.  She is doing more walking and hiking and doing her PT exercises for her low back and her back has been doing much better.  She gets pain periodically before this in the low back.  She has history of low back pain for about the past 5 years.    Abdominal bloating and cramping  She is having tummy troubles.   She was born premature  Hx of using prevacid in High School. Said helped but then it didn't.   Says her stomach is upset whether or not she eats and time of day.   Feels like she always has a tummy ache. Says he sometimes can't tell if she is hungry or if she will be in pain if she eats. If she goes a while without eating it makes a lot of noises.   She has not done any food elimination trials.   She is vegetarian now for a couple of months. Not too hard because she didn't eat much meat. No real change in her symptoms.   She is crampy and bloated.   BMs are not always regular but usually in the morning. Easy to pass. Says she does have blood with wiping sometimes.   Sometimes when she goes on a long walk and when she gets back as to have a BM and will have more blood with wiping like she just started her period.   The past year for belly issues. Thinks the last few months she has noticed the blood with wiping. Maybe once per week or every other week.   She started probiotic because of symptoms. Started about a year or more ago and says that it has helped some. Can tell a difference if she forgets to take it.   No FHx of GI problems.   Most days she is eating regular meals.   Feels she gets messed up when on a different work scheduled. She has to eat when she assigned.   She works as a .     Objective   Vital Signs:   /78 (BP Location: Right arm, Patient Position: Sitting, Cuff Size: Adult)    "Pulse 91   Temp 97.1 °F (36.2 °C) (Infrared)   Ht 161.3 cm (63.5\")   Wt 89 kg (196 lb 4.8 oz)   SpO2 99%   BMI 34.23 kg/m²     Physical Exam  Vitals reviewed.   Constitutional:       General: She is not in acute distress.  HENT:      Right Ear: Tympanic membrane, ear canal and external ear normal. There is no impacted cerumen.      Left Ear: Tympanic membrane, ear canal and external ear normal. There is no impacted cerumen.      Ears:      Comments: Very small effusion on the left.  Eyes:      General: No scleral icterus.        Right eye: No discharge.         Left eye: No discharge.      Conjunctiva/sclera: Conjunctivae normal.   Cardiovascular:      Rate and Rhythm: Normal rate and regular rhythm.      Heart sounds: Normal heart sounds. No murmur heard.   No friction rub. No gallop.    Pulmonary:      Effort: Pulmonary effort is normal. No respiratory distress.      Breath sounds: Normal breath sounds. No wheezing.   Abdominal:      General: Bowel sounds are normal. There is no distension.      Palpations: Abdomen is soft. There is no mass.      Tenderness: There is no abdominal tenderness. There is no guarding.   Neurological:      Mental Status: She is alert and oriented to person, place, and time.   Psychiatric:         Behavior: Behavior normal.        Result Review :     CMP    CMP 9/8/20   Glucose 92   BUN 11   Creatinine 0.87   eGFR Non  Am 76   eGFR African Am 93   Sodium 139   Potassium 4.3   Chloride 102   Calcium 9.5   Total Protein 6.9   Albumin 4.90   Globulin 2.0   Total Bilirubin 0.6   Alkaline Phosphatase 78   AST (SGOT) 23   ALT (SGPT) 23           CBC w/diff    CBC w/Diff 9/8/20   WBC 4.22   RBC 4.21   Hemoglobin 12.5   Hematocrit 38.6   MCV 91.7   MCH 29.7   MCHC 32.4   RDW 13.1   Platelets 222   Neutrophil Rel % 58.5   Lymphocyte Rel % 24.9   Monocyte Rel % 12.6 (A)   Eosinophil Rel % 2.8   Basophil Rel % 1.2   (A) Abnormal value            Lipid Panel    Lipid Panel 9/8/20   Total " Cholesterol 151   Triglycerides 56   HDL Cholesterol 67 (A)   VLDL Cholesterol 11.2   LDL Cholesterol  73   LDL/HDL Ratio 1.09   (A) Abnormal value            TSH    TSH 9/8/20   TSH 2.460                     Assessment and Plan    Diagnoses and all orders for this visit:    1. Abdominal bloating (Primary)  -     CBC & Differential  -     Comprehensive Metabolic Panel  -     TSH  -     T4  -     Tissue Transglutaminase, IgA    2. Abdominal cramping  -     CBC & Differential  -     Comprehensive Metabolic Panel  -     TSH  -     T4  -     Tissue Transglutaminase, IgA    3. Rectal bleeding  -     CBC & Differential    4. Encounter for hepatitis C screening test for low risk patient  -     Hepatitis C Antibody    Other orders  -     busPIRone (BUSPAR) 10 MG tablet; Take 1 tablet by mouth 3 (Three) Times a Day.  Dispense: 90 tablet; Refill: 3        Follow Up   No follow-ups on file.  Patient was given instructions and counseling regarding her condition or for health maintenance advice. Please see specific information pulled into the AVS if appropriate.     She has been having chronic abdominal symptoms. She has bloating and cramping. She has inconsistent symptoms and has not identified any real triggers or behaviors that relieve other than maybe the probiotic helping.   She has hx of hematochezia. She says this was from college and she went to [employer] and was given suppositories. I told her I could check if she has hemorrhoids and do an exam but she declined. I asked a few different ways to help get the evaluation related to her report of bleeding and she said it really isn't that much and she did not want an exam or anymore suppositories at this time.   We are going to get labs to evaluate.   Once labs are back we are going to assess next steps and recommended diet or habit changes.   She sounds more like constipation and hemorrhoids to me but with bloating she could have gluten intolerance or sensitivity.    She has been losing weight about 7 lbs since 9/2020 with her level of activity.   Going to get her back on buspirone as well. She doesn't remember stopping this but more not being able to get it because her doctor was gone. She wants to restart. Could also have a component of IBS with longstanding symptoms and anxiety.     Back in 3-4 months to reassess.

## 2021-05-14 NOTE — PATIENT INSTRUCTIONS
To restart the buspirone I recommend that you break your 10 mg tablets in half.  Take 2-3 of the 5 mg doses daily.  You can administer it in the morning upon waking at midday and in the evening or before bed.  After being on the medication for a week or 2 you can increase one of your daily doses to 10 mg every 2 to 3 days until you are taking a maximum of 10 mg 3 times per day.  Let me know if you have any questions.

## 2021-06-18 DIAGNOSIS — F39 MOOD DISORDER (HCC): ICD-10-CM

## 2021-06-18 RX ORDER — FLUOXETINE HYDROCHLORIDE 40 MG/1
CAPSULE ORAL
Qty: 30 CAPSULE | Refills: 1 | OUTPATIENT
Start: 2021-06-18

## 2021-06-21 DIAGNOSIS — F39 MOOD DISORDER (HCC): ICD-10-CM

## 2021-06-21 RX ORDER — FLUOXETINE HYDROCHLORIDE 40 MG/1
40 CAPSULE ORAL DAILY
Qty: 90 CAPSULE | Refills: 0 | Status: SHIPPED | OUTPATIENT
Start: 2021-06-21 | End: 2021-09-27

## 2021-06-21 NOTE — TELEPHONE ENCOUNTER
Caller: Ena Lyon    Relationship: Self    Best call back number: [phone] (H)    Medication needed:   FLUoxetine (PROzac) 40 MG capsule    When do you need the refill by: 06/24/21    What additional details did the patient provide when requesting the medication: PATIENT CALLED TO REQUEST A MEDICATION REFILL ON HER MEDICATION. PATIENT STATES THAT SHE HAS A 3 DAY SUPPLY LEFT.       Does the patient have less than a 3 day supply:  [] Yes  [x] No    What is the patient's preferred pharmacy:      [facility] [address] - [phone]  - [phone] FX      THANKS

## 2021-06-24 ENCOUNTER — OFFICE VISIT (OUTPATIENT)
Dept: FAMILY MEDICINE CLINIC | Facility: CLINIC | Age: 31
End: 2021-06-24

## 2021-06-24 VITALS
HEIGHT: 64 IN | SYSTOLIC BLOOD PRESSURE: 114 MMHG | TEMPERATURE: 97.3 F | HEART RATE: 79 BPM | DIASTOLIC BLOOD PRESSURE: 82 MMHG | OXYGEN SATURATION: 100 % | BODY MASS INDEX: 33 KG/M2 | WEIGHT: 193.3 LBS | RESPIRATION RATE: 16 BRPM

## 2021-06-24 DIAGNOSIS — L03.116 CELLULITIS OF LEFT LOWER EXTREMITY: Primary | ICD-10-CM

## 2021-06-24 PROCEDURE — 99213 OFFICE O/P EST LOW 20 MIN: CPT | Performed by: FAMILY MEDICINE

## 2021-06-24 RX ORDER — CEPHALEXIN 500 MG/1
500 CAPSULE ORAL 2 TIMES DAILY
Qty: 14 CAPSULE | Refills: 0 | Status: SHIPPED | OUTPATIENT
Start: 2021-06-24 | End: 2021-07-27

## 2021-06-24 NOTE — PROGRESS NOTES
"Chief Complaint  Insect Bite (left knee, back of  leg, states looks infected, found on monday)    Subjective          Ena Lyon presents to [facility] PRIMARY [facility]  History of Present Illness  Patient went hiking 4 days ago.  She says she did wear bug spray with DEET.  She was hiking through the woods heavily wooded area and thinks she got a bug bite on the back of her left leg.  She has put Neosporin on it but it is weeping and crusty and she feels like it is getting worse instead of better.  She is concerned for infection.  She is not sure what bit her.  She did not find any ticks on her.  Denies having any fever, nausea, or feeling poorly in any other way.  Objective   Vital Signs:   /82 (BP Location: Right arm, Patient Position: Sitting, Cuff Size: Adult)   Pulse 79   Temp 97.3 °F (36.3 °C) (Infrared)   Resp 16   Ht 161.3 cm (63.5\")   Wt 87.7 kg (193 lb 4.8 oz)   SpO2 100%   BMI 33.70 kg/m²     Physical Exam  Vitals reviewed.   Constitutional:       General: She is not in acute distress.  Eyes:      General: No scleral icterus.     Conjunctiva/sclera: Conjunctivae normal.   Pulmonary:      Effort: Pulmonary effort is normal. No respiratory distress.   Skin:     Findings: Erythema present.      Comments: On her posterior left knee she has a 2 cm mildly erythematous plaque.  It appears wet with ointment, but medially there is crusted drainage that is a pale yellow color.  No significant swelling.  She does have tenderness of the surrounding area.  No streaking.   Neurological:      Mental Status: She is alert and oriented to person, place, and time.   Psychiatric:         Behavior: Behavior normal.        Result Review :                 Assessment and Plan    Diagnoses and all orders for this visit:    1. Cellulitis of left lower extremity (Primary)    Other orders  -     cephalexin (Keflex) 500 MG capsule; Take 1 capsule by mouth 2 (Two) Times a Day.  Dispense: 14 capsule; " Refill: 0        Follow Up   No follow-ups on file.  Patient was given instructions and counseling regarding her condition or for health maintenance advice. Please see specific information pulled into the AVS if appropriate.     I washed the wound with 30 cc of sterile normal saline.  Cleared away the crusting and applied Silvadene.  We then applied a nonadherent gauze and wrapped the knee and Coban.  I instructed her to keep this on for 24 to 36 hours and then she can remove and keep the area open to air.  Keep it clean keep it dry.  If it is crusting or healing over with scabs she can use Vaseline but avoid use of antibiotic ointment at this point.  I will also give her an antibiotic to treat concern for cellulitis.  Whether or not she got bit by a bug or if she was scraped in the woods and then it became infected we went to cover for skin infection.  I told her that using DEET is probably the strongest bug spray but also recommended that she cover her skin with clothing wearing tall socks and long pants.  She was just hiking in shorts that day and I think that makes her more vulnerable than she needs to be when walking in the woods.  She voiced understanding.  Also counseled her on concerning symptoms for which to call back or be seen.  Any worsening at the site of infection any new symptoms or fever she should call right away.  Patient voiced understanding.

## 2021-07-27 ENCOUNTER — OFFICE VISIT (OUTPATIENT)
Dept: FAMILY MEDICINE CLINIC | Facility: CLINIC | Age: 31
End: 2021-07-27

## 2021-07-27 VITALS
HEART RATE: 90 BPM | BODY MASS INDEX: 34.24 KG/M2 | DIASTOLIC BLOOD PRESSURE: 100 MMHG | TEMPERATURE: 97.3 F | SYSTOLIC BLOOD PRESSURE: 147 MMHG | OXYGEN SATURATION: 99 % | HEIGHT: 63 IN

## 2021-07-27 DIAGNOSIS — J02.9 SORE THROAT: Primary | ICD-10-CM

## 2021-07-27 PROCEDURE — 99213 OFFICE O/P EST LOW 20 MIN: CPT | Performed by: NURSE PRACTITIONER

## 2021-07-27 NOTE — PROGRESS NOTES
"Chief Complaint  Cough (c/o cough, headache, sinus pressure, bodyache, sore throat, poss fever, x3days )    Subjective          Ena Lyon presents to [facility] PRIMARY [facility]  History of Present Illness new pt to me.  here for malaise, feeling bad since Sunday.  Started with tickle in her throat and has been worsening.  Yesterday developed body aches and headache and feels like she has a fever.  Has some congestion. Has been in restaurants over the last week without mask. 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  She has a cough and a little dyspnea.  No history of asthma.  Is on MEMO.  Not sexually active.   Has had both covid vaccines.    Objective   Vital Signs:   /100   Pulse 90   Temp 97.3 °F (36.3 °C)   Ht 160 cm (63\")   SpO2 99%   BMI 34.24 kg/m²     Physical Exam  Vitals and nursing note reviewed.   Constitutional:       General: She is not in acute distress.     Appearance: She is well-developed. She is ill-appearing (mildly ill appearing). She is not diaphoretic.   HENT:      Head: Normocephalic and atraumatic.      Right Ear: Tympanic membrane, ear canal and external ear normal.      Left Ear: Tympanic membrane, ear canal and external ear normal.      Mouth/Throat:      Mouth: Mucous membranes are moist.      Pharynx: No posterior oropharyngeal erythema.   Eyes:      General:         Right eye: No discharge.         Left eye: No discharge.      Conjunctiva/sclera: Conjunctivae normal.   Cardiovascular:      Rate and Rhythm: Normal rate and regular rhythm.      Heart sounds: Normal heart sounds.   Pulmonary:      Effort: Pulmonary effort is normal.      Breath sounds: Normal breath sounds. No wheezing or rhonchi.   Abdominal:      General: Bowel sounds are normal.      Palpations: Abdomen is soft.      Tenderness: There is no abdominal tenderness.   Musculoskeletal:         General: No deformity. "      Comments: Gait smooth and steady   Skin:     General: Skin is warm and dry.   Neurological:      General: No focal deficit present.      Mental Status: She is alert and oriented to person, place, and time.   Psychiatric:         Mood and Affect: Mood normal.         Behavior: Behavior normal.        Result Review :                 Assessment and Plan    Diagnoses and all orders for this visit:    1. Sore throat (Primary)  -     COVID-19,[facility] ROUTINE, NP/OP SWAB IN TRANSPORT MEDIA OR ESWAB 72 HR TAT - Swab, Nasopharynx      Will swab for covid and quarantine until results back.  She will need maintain hydration, OTC meds for sx relief.  If covid is + would consider baby asa daily until resolved since on MEMO. We discussed s/s requiring emergent tx and home covid monitoring.             Follow Up {Instructions Charge Capture  Follow-up Communications :23}  Return if symptoms worsen or fail to improve.  Patient was given instructions and counseling regarding her condition or for health maintenance advice. Please see specific information pulled into the AVS if appropriate.

## 2021-07-28 LAB
LABCORP SARS-COV-2, NAA 2 DAY TAT: NORMAL
SARS-COV-2 RNA RESP QL NAA+PROBE: DETECTED

## 2021-07-29 ENCOUNTER — TELEPHONE (OUTPATIENT)
Dept: FAMILY MEDICINE CLINIC | Facility: CLINIC | Age: 31
End: 2021-07-29

## 2021-07-29 NOTE — TELEPHONE ENCOUNTER
Caller: Ena Lyon    Relationship to patient: Self    Best call back number:     Date of exposure:     Date of positive COVID19 test: 07/27/21    Date if possible COVID19 exposure:     COVID19 symptoms: FEVER, SORE THROAT, HEADACHE, CONGESTION, COUGH, BODY ACHES    Date of initial quarantine:     Additional information or concerns: PATIENT IS CALLING IN STATING THAT SHE TESTED POSITIVE FOR COVID ON Tuesday July 27 AND WANTS TO KNOW HOW LONG SHE NEEDS TO BE IN QUARANTINE.     What is the patients preferred pharmacy: [facility]  [address] [place] [address]  [phone]

## 2021-07-29 NOTE — TELEPHONE ENCOUNTER
Pt was advised quarantine guidelines state that 7-10 days from  first symptoms. Pt states that day was Monday. Voiced understanding.

## 2021-08-17 ENCOUNTER — OFFICE VISIT (OUTPATIENT)
Dept: FAMILY MEDICINE CLINIC | Facility: CLINIC | Age: 31
End: 2021-08-17

## 2021-08-17 VITALS
DIASTOLIC BLOOD PRESSURE: 72 MMHG | SYSTOLIC BLOOD PRESSURE: 126 MMHG | HEIGHT: 63 IN | WEIGHT: 192.5 LBS | BODY MASS INDEX: 34.11 KG/M2 | RESPIRATION RATE: 17 BRPM | OXYGEN SATURATION: 99 % | TEMPERATURE: 96.9 F | HEART RATE: 94 BPM

## 2021-08-17 DIAGNOSIS — G93.31 POSTVIRAL FATIGUE SYNDROME: Primary | ICD-10-CM

## 2021-08-17 PROCEDURE — 99213 OFFICE O/P EST LOW 20 MIN: CPT | Performed by: FAMILY MEDICINE

## 2021-08-17 RX ORDER — BUSPIRONE HYDROCHLORIDE 5 MG/1
5 TABLET ORAL 3 TIMES DAILY
Qty: 90 TABLET
Start: 2021-08-17 | End: 2022-02-03

## 2021-08-17 NOTE — PROGRESS NOTES
"Chief Complaint  Follow-up (3 MFU abdominal pain- states slightly better)    Subjective          Ena Lyon presents to [facility] PRIMARY [facility]ARE  History of Present Illness  Dx with COVID 7/27. Feeling better beginning of August. She went back to work appropriately on 8/7 after 10 days and improved symptoms.   She was supposed to work until 5 pm but by 4 she needed to leave.   She was getting headache and feeling she shaky and sick. On the drive home she had to pull over and vomit.   She did not have the same belly problems while sick with COVID she was not eating frozen meals.  Also drinking her water from home and not from work.   She was vaccinated. And masked all the time. She was wearing even when masking was lifted. She was unmasked around co workers who were vaccinated.   She did travel to [place] less than two weeks prior to dx.   Was with two friends who were vaccinated. They were vaccinated.   She did walk 3 miles [place] [address] two days ago.   Denies SOB or trouble breathing.    Of note she works for [employer] and they do not have central heating or cooling. They have not had air conditioning all summer or heat in the winter.   They have complained to the local and [employer] boards for this lack of appropriate systems for ventilation.   She says they have small units for cooling and large fans.   Often times co-workers have headaches at work.   In the fall she suffered from a headache, shakiness and nausea with significant vomiting after a shift. This was similar to what she describes happening on 8/7 when she returned to work for the first time.     Objective   Vital Signs:   /72 (BP Location: Right arm, Patient Position: Sitting, Cuff Size: Adult)   Pulse 94   Temp 96.9 °F (36.1 °C) (Infrared)   Resp 17   Ht 160 cm (63\")   Wt 87.3 kg (192 lb 8 oz)   SpO2 99%   BMI 34.10 kg/m²     Physical Exam  Vitals reviewed.   Constitutional:       General: She is not in acute " distress.  Eyes:      General: No scleral icterus.     Conjunctiva/sclera: Conjunctivae normal.   Pulmonary:      Effort: Pulmonary effort is normal. No respiratory distress.   Neurological:      Mental Status: She is alert and oriented to person, place, and time.   Psychiatric:         Behavior: Behavior normal.        Result Review :                 Assessment and Plan    Diagnoses and all orders for this visit:    1. Postviral fatigue syndrome (Primary)    Other orders  -     busPIRone (BUSPAR) 5 MG tablet; Take 1 tablet by mouth 3 (Three) Times a Day.  Dispense: 90 tablet        Follow Up   No follow-ups on file.  Patient was given instructions and counseling regarding her condition or for health maintenance advice. Please see specific information pulled into the AVS if appropriate.     No associated symptoms, just the fatigue.  I am concerned about her fatigue and her working conditions. She works 5-6 days per week. She may benefit from a slower return to work especially considering the heat in the setting. She could return at fewer days or short shifts for 2-4 weeks and we can check in to graduate her to full time.   She can use the time to rest and regain strength.   I recommended she hydrate well and keep up with diet changes she has made to help her abdominal discomfort.   Also dial back the physical activity to 0.5-1 mile but make it more consistent. This can help with fatigue and stamina.   She is going to communicate with HR and let me know about need for FMLA paperwork.     She has 10 mg buspar but never took the 5 mg is helping.

## 2021-09-27 DIAGNOSIS — F39 MOOD DISORDER (HCC): ICD-10-CM

## 2021-09-27 RX ORDER — FLUOXETINE HYDROCHLORIDE 40 MG/1
CAPSULE ORAL
Qty: 90 CAPSULE | Refills: 1 | Status: SHIPPED | OUTPATIENT
Start: 2021-09-27 | End: 2022-04-04

## 2021-10-05 ENCOUNTER — OFFICE VISIT (OUTPATIENT)
Dept: FAMILY MEDICINE CLINIC | Facility: CLINIC | Age: 31
End: 2021-10-05

## 2021-10-05 VITALS
HEIGHT: 63 IN | RESPIRATION RATE: 17 BRPM | BODY MASS INDEX: 34.2 KG/M2 | TEMPERATURE: 97.1 F | DIASTOLIC BLOOD PRESSURE: 88 MMHG | SYSTOLIC BLOOD PRESSURE: 138 MMHG | HEART RATE: 80 BPM | WEIGHT: 193 LBS | OXYGEN SATURATION: 98 %

## 2021-10-05 DIAGNOSIS — R51.9 FREQUENT HEADACHES: Primary | ICD-10-CM

## 2021-10-05 DIAGNOSIS — Z86.79 HISTORY OF HYPERTENSION: ICD-10-CM

## 2021-10-05 DIAGNOSIS — G93.31 POSTVIRAL FATIGUE SYNDROME: ICD-10-CM

## 2021-10-05 PROCEDURE — 90686 IIV4 VACC NO PRSV 0.5 ML IM: CPT | Performed by: FAMILY MEDICINE

## 2021-10-05 PROCEDURE — 90471 IMMUNIZATION ADMIN: CPT | Performed by: FAMILY MEDICINE

## 2021-10-05 PROCEDURE — 99214 OFFICE O/P EST MOD 30 MIN: CPT | Performed by: FAMILY MEDICINE

## 2021-10-05 RX ORDER — BUSPIRONE HYDROCHLORIDE 10 MG/1
10 TABLET ORAL 3 TIMES DAILY
COMMUNITY
Start: 2021-08-24 | End: 2021-10-05 | Stop reason: DRUGHIGH

## 2021-10-05 RX ORDER — SUMATRIPTAN 100 MG/1
TABLET, FILM COATED ORAL
Qty: 12 TABLET | Refills: 0 | Status: SHIPPED | OUTPATIENT
Start: 2021-10-05 | End: 2021-12-20

## 2021-10-05 NOTE — PROGRESS NOTES
"Chief Complaint  No chief complaint on file.    Subjective          Ena Lyon presents to [facility] PRIMARY [facility]ARE  History of Present Illness  Headaches   Had off and on periods of headaches past few years.   Started having again and they are the same type of headache.  Says dull in the back of her head. Center of the back of the head.  Not throbbing  She got sweaty and nauseated with it.   No visual changes.  No auras.   She takes Ibuprofen not sure it helps, has to lie down. At least two hours.   We were considering FMLA because of her residual fatigue and headaches seemingly starting with her COVID infection but she says she does not want to proceed with this right now.   Her work  Schedule is about to change for the better and she will work during the week and not on the weekends or evenings.     Objective   Vital Signs:   /88 (BP Location: Right arm, Patient Position: Sitting, Cuff Size: Adult)   Pulse 80   Temp 97.1 °F (36.2 °C) (Infrared)   Resp 17   Ht 160 cm (63\")   Wt 87.5 kg (193 lb)   SpO2 98%   BMI 34.19 kg/m²     Physical Exam  Vitals reviewed.   Constitutional:       General: She is not in acute distress.  Eyes:      General: No scleral icterus.     Conjunctiva/sclera: Conjunctivae normal.   Pulmonary:      Effort: Pulmonary effort is normal. No respiratory distress.   Neurological:      General: No focal deficit present.      Mental Status: She is alert and oriented to person, place, and time.   Psychiatric:         Behavior: Behavior normal.          Result Review :                 Assessment and Plan    Diagnoses and all orders for this visit:    1. Frequent headaches (Primary)    2. Postviral fatigue syndrome    3. History of hypertension    Other orders  -     SUMAtriptan (IMITREX) 100 MG tablet; Take one tablet at onset of headache. May repeat dose one time in 2 hours if headache not relieved. Can take 50 mg as first dose.  Dispense: 12 tablet; Refill: 0  -     " FluLaval/Fluarix (VFC) >6 Months        Follow Up   No follow-ups on file.  Patient was given instructions and counseling regarding her condition or for health maintenance advice. Please see specific information pulled into the AVS if appropriate.     She is still having some lingering symptoms including fatigue and headaches. She has had headaches in the past similar to what she is experiencing now. They are posterior headaches and they do require her to lie down. She does not get a lot of relief from ibuprofen. She does not have throbbing but they can be associated with nausea, no vomiting.   We are going to try an abortive for migraines. Some atypical features but migraine is possible especially with a young female, history of headaches. She does not have aura after several lines of questioning so I think the birth control is ok.   Also after further chart review she does have a hx of HTN. She was treated with metoprolol succinate in the past because she also had tachycardia. Today she is on no BP meds. Her blood pressure is mildly elevated but HR is normal. Maybe BPs are going up. Will see how sumatriptan helps and go from there. May have to get her to take BP readings and adjust the plan if she is not having reduction in headaches may have to start prophylaxis.   Would start with magnesium supplementation.

## 2021-10-08 ENCOUNTER — TELEPHONE (OUTPATIENT)
Dept: FAMILY MEDICINE CLINIC | Facility: CLINIC | Age: 31
End: 2021-10-08

## 2021-10-08 NOTE — TELEPHONE ENCOUNTER
Caller: LEANDRO MADSNE    Relationship to patient: SELF    Best call back number: [phone]    Date of exposure: 9/21/2021    Date of positive COVID19 test: N/A    Date if possible COVID19 exposure: 9/21/2021    COVID19 symptoms: HEADACHE AND FEELING TIRED.    Date of initial quarantine: N/A    Additional information or concerns: PATIENT STATES SHE IS HAVING HEADACHES, FEELING TIRED.    What is the patients preferred pharmacy: N/A

## 2021-11-19 ENCOUNTER — OFFICE VISIT (OUTPATIENT)
Dept: FAMILY MEDICINE CLINIC | Facility: CLINIC | Age: 31
End: 2021-11-19

## 2021-11-19 VITALS
TEMPERATURE: 98.9 F | SYSTOLIC BLOOD PRESSURE: 128 MMHG | OXYGEN SATURATION: 98 % | HEART RATE: 93 BPM | WEIGHT: 195 LBS | DIASTOLIC BLOOD PRESSURE: 78 MMHG | HEIGHT: 63 IN | BODY MASS INDEX: 34.55 KG/M2 | RESPIRATION RATE: 18 BRPM

## 2021-11-19 DIAGNOSIS — F41.9 ANXIETY: ICD-10-CM

## 2021-11-19 DIAGNOSIS — R00.2 PALPITATIONS: ICD-10-CM

## 2021-11-19 DIAGNOSIS — R06.02 SHORTNESS OF BREATH: Primary | ICD-10-CM

## 2021-11-19 PROCEDURE — 99214 OFFICE O/P EST MOD 30 MIN: CPT | Performed by: FAMILY MEDICINE

## 2021-11-19 PROCEDURE — 93000 ELECTROCARDIOGRAM COMPLETE: CPT | Performed by: FAMILY MEDICINE

## 2021-12-17 ENCOUNTER — TELEPHONE (OUTPATIENT)
Dept: FAMILY MEDICINE CLINIC | Facility: CLINIC | Age: 31
End: 2021-12-17

## 2021-12-17 NOTE — TELEPHONE ENCOUNTER
Rx Refill Note  Requested Prescriptions     Pending Prescriptions Disp Refills   • SUMAtriptan (IMITREX) 100 MG tablet [Pharmacy Med Name: SUMAtriptan SUCC 100 MG TABLET] 9 tablet      Sig: TAKE ONE TABLET BY MOUTH AT ONSET OF HEADACHE; MAY REPEAT ONE TABLET IN 2 HOURS IF NEEDED. -- MAY TAKE 1/2 TABLET AS FIRST DOSE      Last office visit with prescribing clinician: 11/19/2021      Next office visit with prescribing clinician: 12/17/2021            Poly Zuñiga LPN  12/17/21, 14:05 EST

## 2021-12-17 NOTE — TELEPHONE ENCOUNTER
Caller: Ena Lyon    Relationship: Self    Best call back number: [phone]    What is the best time to reach you: 2PM TO 4PM    Who are you requesting to speak with (clinical staff, provider,  specific staff member): CLINICAL STAFF MEMBER    What was the call regarding: PATIENT STATES THAT SHE WOULD LIKE TO KNOW MORE ABOUT SEEING A NEUROLOGIST. THE PATIENT STATES THAT THE SUMAtriptan (IMITREX) 100 MG tablet, DOES SEEM TO BE HELPING. THE PATIENT STATES THAT SHE WOULD LIKE A CALL BACK TO DISCUSS.    Do you require a callback: YES

## 2021-12-20 RX ORDER — SUMATRIPTAN 100 MG/1
TABLET, FILM COATED ORAL
Qty: 9 TABLET | Refills: 2 | Status: SHIPPED | OUTPATIENT
Start: 2021-12-20 | End: 2022-04-19

## 2021-12-20 NOTE — TELEPHONE ENCOUNTER
Called this patient back. Left VM for her. Will attempt again later today.      HUB transfer this patient to the office to speak with this nurse.

## 2022-01-11 ENCOUNTER — OFFICE VISIT (OUTPATIENT)
Dept: FAMILY MEDICINE CLINIC | Facility: CLINIC | Age: 32
End: 2022-01-11

## 2022-01-11 VITALS
BODY MASS INDEX: 35.31 KG/M2 | SYSTOLIC BLOOD PRESSURE: 128 MMHG | TEMPERATURE: 98.8 F | RESPIRATION RATE: 17 BRPM | HEIGHT: 63 IN | WEIGHT: 199.3 LBS | DIASTOLIC BLOOD PRESSURE: 86 MMHG | HEART RATE: 87 BPM | OXYGEN SATURATION: 99 %

## 2022-01-11 DIAGNOSIS — R45.89 DEPRESSED MOOD: ICD-10-CM

## 2022-01-11 DIAGNOSIS — F41.9 ANXIETY: Primary | ICD-10-CM

## 2022-01-11 DIAGNOSIS — R06.02 SOB (SHORTNESS OF BREATH): ICD-10-CM

## 2022-01-11 PROCEDURE — 99214 OFFICE O/P EST MOD 30 MIN: CPT | Performed by: FAMILY MEDICINE

## 2022-01-11 NOTE — PROGRESS NOTES
"Chief Complaint  Shortness of Breath (3 MFU, states is about the same ) and Depression (phq-2  score 7 )    Subjective          Ena Lyon presents to [facility]  History of Present Illness  SOB  Says overall she thinks this is better.   Lightheaded/dizziness  Not spinning  Happened before bout 5 years ago.   She had work up.   Happens daily.   Hydrating well with water.   She is working one night per week.   She denies chest pain.   Not motivated to leave her house.   Feels down but she is not suicidal.   Says more than anything she has a lot of anxiety.    She missed these days related to severe migraine flares that were associated with her COVID. In December she was also sick and missed a day of work. She has these 3 days in the last 12 months that are unexcused. She was under my care during these times and I can write her a letter for an excuse from work. She has a lot of anxiety about an excuse absences from work and a lot of anxiety in general so I think this will help and she was significantly ill during these days and I took care of her on all occasions.  8/14/21 sick left early, pulled over, vomiting. Severe headache.   12/14/21 she had headache so nauseated could not move, too sick to drive. Did not go to work.   9/8/21 super fatigued, wiped out. No headache.     Objective   Vital Signs:   /86 (BP Location: Right arm, Patient Position: Sitting, Cuff Size: Large Adult)   Pulse 87   Temp 98.8 °F (37.1 °C) (Temporal)   Resp 17   Ht 160 cm (63\")   Wt 90.4 kg (199 lb 4.8 oz)   SpO2 99%   BMI 35.30 kg/m²     Physical Exam  Vitals reviewed.   Constitutional:       General: She is not in acute distress.  Eyes:      General: No scleral icterus.     Conjunctiva/sclera: Conjunctivae normal.   Pulmonary:      Effort: Pulmonary effort is normal. No respiratory distress.      Breath sounds: Normal breath sounds. No wheezing, rhonchi or rales.   Neurological:      Mental " Status: She is alert and oriented to person, place, and time.   Psychiatric:         Behavior: Behavior normal.      Comments: Anxious, flattened affect.        Result Review :                 Assessment and Plan    Diagnoses and all orders for this visit:    1. Anxiety (Primary)    2. Depressed mood    3. SOB (shortness of breath)        Follow Up   No follow-ups on file.  Patient was given instructions and counseling regarding her condition or for health maintenance advice. Please see specific information pulled into the AVS if appropriate.     Seems her anxiety is not well controlled at this time.   We talked about increasing the fluoxetine to 60 mg as she is anxious and has some depressed mood as well.   We discussed that she may have potential for side effects of this med at higher dose. We could add bupropion but she thinks she has been on this before. We ended up deciding to increase her buspirone. I gave her directions on how to do this and avoid side effects and timing of it to take it at work as this is where she has the most anxiety.     I am going to write her work excuse for the days above. She was under my care at that time in the office before or after or over the phone for each event. She does not have to take an unexcused absence and this is also causing her a lot of anxiety.     Her SOB is getting better. She gets it more at work. She really has not been exerting herself because of weather and poor motivation. I think her SOB is more anxiety than anything. Her cough is better.     I'm concerned that COVID-19 in her like so many others has left residual problems with migraines/headaches and reactivated her migraines which she has a history of and can be associated with nausea and vomiting.     Close follow up with phone call on 2/2

## 2022-01-11 NOTE — PATIENT INSTRUCTIONS
We will do a phone call together on February 2.  I will try to call you the first half of the day.    Plan today is to increase your buspirone dose.  We will first start by having you take 5 mg at lunchtime so that you will always be taking it 3 times a day.  After that you can increase your buspirone dose 5 mg/day every 2 to 3 days.  You could do this by adding a 5 mg to your morning dose so than you would take 10 mg at home before work, 5 mg while at work over lunchtime and then another 5 mg at home in the evening at 6:00 PM.  After a few days you can increase your lunchtime dose or your evening dose to 10 mg to see how you feel.  It is good to take this medication with food because it can make you a little bit nauseated when you go up on the dose.

## 2022-02-03 ENCOUNTER — TELEMEDICINE (OUTPATIENT)
Dept: FAMILY MEDICINE CLINIC | Facility: CLINIC | Age: 32
End: 2022-02-03

## 2022-02-03 DIAGNOSIS — G43.009 MIGRAINE WITHOUT AURA AND WITHOUT STATUS MIGRAINOSUS, NOT INTRACTABLE: ICD-10-CM

## 2022-02-03 DIAGNOSIS — F41.9 ANXIETY: Primary | ICD-10-CM

## 2022-02-03 PROCEDURE — 99213 OFFICE O/P EST LOW 20 MIN: CPT | Performed by: FAMILY MEDICINE

## 2022-02-03 RX ORDER — BUSPIRONE HYDROCHLORIDE 10 MG/1
10 TABLET ORAL 3 TIMES DAILY
Qty: 270 TABLET | Refills: 1 | Status: SHIPPED | OUTPATIENT
Start: 2022-02-03 | End: 2022-08-15

## 2022-02-03 NOTE — PROGRESS NOTES
Chief Complaint  Anxiety    Subjective          Ena Lyon presents to [facility]  History of Present Illness  Said a couple of days ago she did not feel so great and took sumatriptan and went to bed.   Felt shaky and nausea  Said first time since end of December.   Felt better after.   Headaches are controlled better.   She is doing half tab of the sumatriptan and then if still there after 1 hour then takes the other half, sometimes the half dose is enough.    Buspirone taking at lunch at work and higher dose at bedtime  Feels she is a little more focused and not quite as anxious.   Says work is going ok.   Not as big of a stress with work. Feels like she getting used to it and she has really great co-workers.   Needs letter for Tuesday.   Doing 10 mg 10 mg and 10 mg in night time.     Has not tried the inhaler yet.   Really only when she has her mask on yang the N95 at work.   Doing deep breaths or go isolate and take deep breaths.   She is not wheezing or coughing.     Objective   Vital Signs:   There were no vitals taken for this visit.    Physical Exam  Constitutional:       General: She is not in acute distress.     Appearance: Normal appearance. She is not ill-appearing.   Eyes:      General: No scleral icterus.     Conjunctiva/sclera: Conjunctivae normal.   Pulmonary:      Effort: Pulmonary effort is normal. No respiratory distress.   Neurological:      Mental Status: She is alert and oriented to person, place, and time.   Psychiatric:         Behavior: Behavior normal.        Result Review :                 Assessment and Plan    Diagnoses and all orders for this visit:    1. Anxiety (Primary)    2. Migraine without aura and without status migrainosus, not intractable    Other orders  -     busPIRone (BUSPAR) 10 MG tablet; Take 1 tablet by mouth 3 (Three) Times a Day.  Dispense: 270 tablet; Refill: 1        Follow Up   No follow-ups on file.  Patient was given instructions  and counseling regarding her condition or for health maintenance advice. Please see specific information pulled into the AVS if appropriate.     She seems really good today. This is the most relaxed I have seen her. Maybe because she is at home for the visit, but she says too that she is feeling better and less stressed.   We are going to continue with the 10 mg of buspirone TID this is likely part of her improvement along with time away from COVID infection and adjusting to work changes. Sounds like she has a great support system.     Headaches are less often and less severe. She is managing better with the sumatriptan higher dose. Because of her physical symptoms and migraine on 2/1/22 she was unable to work. I will write her a note for this day.     Patient gave consent today for a telehealth video visit as following recommendations of our governor and declaration of state of emergency due to inclement weather. She is in her home in [place], [place].    8 min was spent in discussion with pt and greater than 50% of that time was spent counseling.   She has follow up scheduled and we reviewed and she is good with this interval.

## 2022-04-03 DIAGNOSIS — F39 MOOD DISORDER: ICD-10-CM

## 2022-04-04 RX ORDER — FLUOXETINE HYDROCHLORIDE 40 MG/1
CAPSULE ORAL
Qty: 90 CAPSULE | Refills: 1 | Status: SHIPPED | OUTPATIENT
Start: 2022-04-04 | End: 2022-10-07

## 2022-04-04 NOTE — TELEPHONE ENCOUNTER
Rx Refill Note  Requested Prescriptions     Pending Prescriptions Disp Refills   • FLUoxetine (PROzac) 40 MG capsule [Pharmacy Med Name: FLUoxetine HCL 40 MG CAPSULE] 90 capsule 1     Sig: TAKE ONE CAPSULE BY MOUTH DAILY      Last office visit with prescribing clinician: 1/11/2022      Next office visit with prescribing clinician: 4/14/2022            Champ Elder MA  04/04/22, 10:39 EDT

## 2022-04-15 ENCOUNTER — OFFICE VISIT (OUTPATIENT)
Dept: FAMILY MEDICINE CLINIC | Facility: CLINIC | Age: 32
End: 2022-04-15

## 2022-04-15 VITALS
DIASTOLIC BLOOD PRESSURE: 82 MMHG | RESPIRATION RATE: 12 BRPM | WEIGHT: 201.6 LBS | SYSTOLIC BLOOD PRESSURE: 140 MMHG | HEART RATE: 101 BPM | HEIGHT: 63 IN | OXYGEN SATURATION: 99 % | BODY MASS INDEX: 35.72 KG/M2 | TEMPERATURE: 97.6 F

## 2022-04-15 DIAGNOSIS — B35.3 TINEA PEDIS OF LEFT FOOT: Primary | ICD-10-CM

## 2022-04-15 PROCEDURE — 99213 OFFICE O/P EST LOW 20 MIN: CPT | Performed by: NURSE PRACTITIONER

## 2022-04-15 NOTE — PATIENT INSTRUCTIONS
Discharge instructions    Over-the-counter Lamisil a very thin layer twice a day  X3 to 4 weeks until clear    Should you have increased pain redness swelling urgent recheck if your symptoms are not resolved after this time recheck in the office    Pat dry and gently wash off excess with a tissue or paper towel

## 2022-04-15 NOTE — PROGRESS NOTES
"Chief Complaint  Foot Pain (Pt states left foot pain)    Subjective          Ena Lyon presents to [facility]  Pleasant patient today she complains of some left foot discomfort, mostly when she walks, it started after walking a couple miles on Wednesday she has been walking a couple days a week, she has had no known injury no severe pain she has most mild discomfort left lateral plantar surface of her foot.  No known fractures or injuries here.  History of mild plus pain is since she has inserts she always wears in her feet.  Overall she is doing well otherwise and Dr. Rose is her PCP    Foot Pain        Objective   Vital Signs:   /82   Pulse 101   Temp 97.6 °F (36.4 °C) (Infrared)   Resp 12   Ht 160 cm (63\")   Wt 91.4 kg (201 lb 9.6 oz)   SpO2 99%   BMI 35.71 kg/m²            Physical Exam  Constitutional:       Appearance: Normal appearance.   Musculoskeletal:      Comments: Left lateral foot mid and proximal fifth metatarsal region on the plantar surface there is approximately 12 to 15 mm annular lesion mild faint pink borders slightly raised and slightly irregular 2 to 3 mm with a centralized clearing.  No palpable abscess or induration,  Applied considerable pressure laterally in both superiorly and posteriorly from the plantar surface pressure nontender in this region and no gross deformity.  Patient is without significant limp.  No cellulitis or worrisome ulcerations or signs of bacterial infection.   Neurological:      Mental Status: She is alert.   Psychiatric:         Mood and Affect: Mood normal.         Behavior: Behavior normal.        Result Review :                 Assessment and Plan    Diagnoses and all orders for this visit:    1. Tinea pedis of left foot (Primary)        Follow Up   No follow-ups on file.  Patient was given instructions and counseling regarding her condition or for health maintenance advice. Please see specific information pulled " into the AVS if appropriate.     Lamisil OTC apply a thin layer for the next 3 to 4 weeks should resolve after that if not let me know if increased pain redness swelling urgent recheck  Follow-up Dr. Rose routinely    Discharge instructions    Over-the-counter Lamisil a very thin layer twice a day  X3 to 4 weeks until clear    Should you have increased pain redness swelling urgent recheck if your symptoms are not resolved after this time recheck in the office    Pat dry and gently wash off excess with a tissue or paper towel

## 2022-04-19 RX ORDER — SUMATRIPTAN 100 MG/1
TABLET, FILM COATED ORAL
Qty: 9 TABLET | Refills: 2 | Status: SHIPPED | OUTPATIENT
Start: 2022-04-19 | End: 2022-05-24 | Stop reason: SINTOL

## 2022-04-19 NOTE — TELEPHONE ENCOUNTER
Rx Refill Note  Requested Prescriptions     Pending Prescriptions Disp Refills   • SUMAtriptan (IMITREX) 100 MG tablet [Pharmacy Med Name: SUMAtriptan SUCC 100 MG TABLET] 9 tablet 2     Sig: TAKE ONE TABLET BY MOUTH AT ONSET OF HEADACHE; MAY REPEAT ONE TABLET IN 2 HOURS IF NEEDED. MAY TAKE 1/2 TABLET AS FIRST DOSE.      Last office visit with prescribing clinician: 1/11/2022      Next office visit with prescribing clinician: 5/24/2022            Poly Zuñiga LPN  04/19/22, 11:07 EDT

## 2022-05-24 ENCOUNTER — OFFICE VISIT (OUTPATIENT)
Dept: FAMILY MEDICINE CLINIC | Facility: CLINIC | Age: 32
End: 2022-05-24

## 2022-05-24 VITALS
TEMPERATURE: 97.8 F | DIASTOLIC BLOOD PRESSURE: 82 MMHG | RESPIRATION RATE: 18 BRPM | OXYGEN SATURATION: 97 % | BODY MASS INDEX: 36.32 KG/M2 | WEIGHT: 205 LBS | HEIGHT: 63 IN | SYSTOLIC BLOOD PRESSURE: 132 MMHG | HEART RATE: 98 BPM

## 2022-05-24 DIAGNOSIS — F41.9 ANXIETY: ICD-10-CM

## 2022-05-24 DIAGNOSIS — G43.009 MIGRAINE WITHOUT AURA AND WITHOUT STATUS MIGRAINOSUS, NOT INTRACTABLE: Primary | ICD-10-CM

## 2022-05-24 DIAGNOSIS — R42 DIZZINESS: ICD-10-CM

## 2022-05-24 PROCEDURE — 99214 OFFICE O/P EST MOD 30 MIN: CPT | Performed by: FAMILY MEDICINE

## 2022-05-24 RX ORDER — RIZATRIPTAN BENZOATE 10 MG/1
10 TABLET ORAL ONCE AS NEEDED
Qty: 6 TABLET | Refills: 1 | Status: SHIPPED | OUTPATIENT
Start: 2022-05-24 | End: 2022-07-13

## 2022-06-09 ENCOUNTER — TELEPHONE (OUTPATIENT)
Dept: FAMILY MEDICINE CLINIC | Facility: CLINIC | Age: 32
End: 2022-06-09

## 2022-06-09 NOTE — TELEPHONE ENCOUNTER
Pt sent my chart message on her mother in patients chart. Called her and left detailed VM for her r/t having to have that message removed from her chart. Advised her that Dr. Rose did not know who Cesar Rolle was but she was able to tell her that Dr. Lizeth Millan, Arabella Rodriguez, Zeina Stephenson were all excellent providers to pick. Advised her that Dr. Nancy Lemus was also listed.

## 2022-06-21 ENCOUNTER — TELEPHONE (OUTPATIENT)
Dept: FAMILY MEDICINE CLINIC | Facility: CLINIC | Age: 32
End: 2022-06-21

## 2022-06-21 NOTE — TELEPHONE ENCOUNTER
Caller: Ena Lyon    Relationship: Self    Best call back number: [phone]    Who are you requesting to speak with (clinical staff, provider,  specific staff member): CLINICAL STAFF     What was the call regarding: CALLING ABOUT STATUS OF FMLA PAPERWORK     Do you require a callback: YES

## 2022-07-12 NOTE — TELEPHONE ENCOUNTER
Rx Refill Note  Requested Prescriptions     Pending Prescriptions Disp Refills   • rizatriptan (MAXALT) 10 MG tablet [Pharmacy Med Name: RIZATRIPTAN 10 MG TABLET] 6 tablet 1     Sig: TAKE ONE TABLET BY MOUTH AT ONSET OF HEADACHE; MAY REPEAT ONE TABLET IN 2 HOURS IF NEEDED.      Last office visit with prescribing clinician: 5/24/2022      Next office visit with prescribing clinician: 9/6/2022            Poly Zuñiga LPN  07/12/22, 09:11 EDT

## 2022-07-13 RX ORDER — RIZATRIPTAN BENZOATE 10 MG/1
TABLET ORAL
Qty: 6 TABLET | Refills: 3 | Status: SHIPPED | OUTPATIENT
Start: 2022-07-13 | End: 2022-09-06 | Stop reason: SDUPTHER

## 2022-08-15 RX ORDER — BUSPIRONE HYDROCHLORIDE 10 MG/1
TABLET ORAL
Qty: 90 TABLET | Refills: 1 | Status: SHIPPED | OUTPATIENT
Start: 2022-08-15 | End: 2023-02-21

## 2022-08-15 NOTE — TELEPHONE ENCOUNTER
Rx Refill Note  Requested Prescriptions     Pending Prescriptions Disp Refills   • busPIRone (BUSPAR) 10 MG tablet [Pharmacy Med Name: BUSPIRONE HCL 10 MG TABLET] 90 tablet      Sig: TAKE ONE TABLET BY MOUTH THREE TIMES A DAY      Last office visit with prescribing clinician: 5/24/2022      Next office visit with prescribing clinician: 9/6/2022            Poly Zuñiga LPN  08/15/22, 14:28 EDT

## 2022-09-06 ENCOUNTER — OFFICE VISIT (OUTPATIENT)
Dept: FAMILY MEDICINE CLINIC | Facility: CLINIC | Age: 32
End: 2022-09-06

## 2022-09-06 VITALS
SYSTOLIC BLOOD PRESSURE: 130 MMHG | DIASTOLIC BLOOD PRESSURE: 86 MMHG | BODY MASS INDEX: 36.62 KG/M2 | HEIGHT: 63 IN | RESPIRATION RATE: 19 BRPM | WEIGHT: 206.7 LBS | TEMPERATURE: 98.2 F | HEART RATE: 91 BPM | OXYGEN SATURATION: 99 %

## 2022-09-06 DIAGNOSIS — Z00.00 ANNUAL PHYSICAL EXAM: Primary | ICD-10-CM

## 2022-09-06 DIAGNOSIS — G43.009 MIGRAINE WITHOUT AURA AND WITHOUT STATUS MIGRAINOSUS, NOT INTRACTABLE: ICD-10-CM

## 2022-09-06 DIAGNOSIS — F41.9 ANXIETY: ICD-10-CM

## 2022-09-06 PROCEDURE — 99395 PREV VISIT EST AGE 18-39: CPT | Performed by: FAMILY MEDICINE

## 2022-09-06 RX ORDER — RIZATRIPTAN BENZOATE 10 MG/1
10 TABLET ORAL ONCE
Qty: 6 TABLET | Refills: 3 | Status: SHIPPED | OUTPATIENT
Start: 2022-09-06 | End: 2022-09-06

## 2022-09-06 RX ORDER — CALCIUM CARBONATE/VITAMIN D3 500-10/5ML
LIQUID (ML) ORAL
COMMUNITY

## 2022-09-06 NOTE — PROGRESS NOTES
"Chief Complaint  Annual Exam, Anxiety (3 MFU ), and Migraine    Subjective        Ena Lyon presents to [facility]  History of Present Illness  Annual exam  She is doing well.   She follows with Dr. Maday Hodges.   She has a migraine today.   Taking the mag.  Says this one off and on since 2 days ago prior to that a couple of weeks. She took the maxalt. She needs to  her refill.   Her gyn said take CoQ10 100 mg every 8 hours.   Periods are different a little lighter. Still regular.   Work is going well.   She has toenail fungus.   She has gained a little weight. She says exercise could be better.   Working through what she can eat for her GI tract. She is vegetarian, thinks it is a little better.     Objective   Vital Signs:  /86 (BP Location: Right arm, Patient Position: Sitting, Cuff Size: Adult)   Pulse 91   Temp 98.2 °F (36.8 °C) (Infrared)   Resp 19   Ht 160 cm (63\")   Wt 93.8 kg (206 lb 11.2 oz)   SpO2 99%   BMI 36.62 kg/m²   Estimated body mass index is 36.62 kg/m² as calculated from the following:    Height as of this encounter: 160 cm (63\").    Weight as of this encounter: 93.8 kg (206 lb 11.2 oz).          Physical Exam  Vitals reviewed.   Constitutional:       General: She is not in acute distress.     Appearance: Normal appearance. She is not ill-appearing or toxic-appearing.   HENT:      Head: Normocephalic and atraumatic.      Right Ear: Tympanic membrane, ear canal and external ear normal. There is no impacted cerumen.      Left Ear: Tympanic membrane, ear canal and external ear normal. There is no impacted cerumen.      Nose: Nose normal.      Mouth/Throat:      Mouth: Mucous membranes are moist.      Pharynx: Oropharynx is clear. No oropharyngeal exudate or posterior oropharyngeal erythema.   Eyes:      General: No scleral icterus.        Right eye: No discharge.         Left eye: No discharge.      Conjunctiva/sclera: Conjunctivae normal. "   Neck:      Thyroid: No thyromegaly.   Cardiovascular:      Rate and Rhythm: Normal rate and regular rhythm.      Heart sounds: Normal heart sounds. No murmur heard.    No friction rub. No gallop.   Pulmonary:      Effort: Pulmonary effort is normal. No respiratory distress.      Breath sounds: Normal breath sounds. No wheezing or rales.   Chest:      Chest wall: No tenderness.   Abdominal:      General: Bowel sounds are normal. There is no distension.      Palpations: Abdomen is soft. There is no mass.      Tenderness: There is no abdominal tenderness. There is no guarding.   Musculoskeletal:         General: No deformity.      Cervical back: Neck supple.      Right lower leg: No edema.      Left lower leg: No edema.   Lymphadenopathy:      Cervical: No cervical adenopathy.   Skin:     Coloration: Skin is not jaundiced or pale.   Neurological:      General: No focal deficit present.      Mental Status: She is alert and oriented to person, place, and time.      Motor: No abnormal muscle tone.      Coordination: Coordination normal.   Psychiatric:         Mood and Affect: Mood normal.         Behavior: Behavior normal.        Result Review :                Assessment and Plan   Diagnoses and all orders for this visit:    1. Annual physical exam (Primary)  -     CBC & Differential  -     Comprehensive Metabolic Panel    2. Migraine without aura and without status migrainosus, not intractable    3. Anxiety    Other orders  -     rizatriptan (MAXALT) 10 MG tablet; Take 1 tablet by mouth 1 (One) Time for 1 dose. AT ONSET OF HEADACHE MAY REPEAT ONE TABLET IN 2 HOURS IF NEEDED  Dispense: 6 tablet; Refill: 3             Follow Up   No follow-ups on file.  Patient was given instructions and counseling regarding her condition or for health maintenance advice. Please see specific information pulled into the AVS if appropriate.     Preventive counseling  We discussed the importance of regular physical activity.  Cardiovascular  activity for the equivalent of 30 minutes 5 days per week.  We also discussed the importance of healthy diet to avoid weight gain and sugar intolerance.  I recommend predominantly filling the diet with vegetables and lean meats followed by fruits and whole grains.  I also recommend overall avoiding processed foods.  She was walking/hiking. This is her preferred method of exercise. Has done a little lately. Not as much. Looking forward to the nice weather.   She is doing better again as far as her migraines frequency.   Says her mood is good. Medications are working well at this time.   Discussed vaccines.

## 2022-09-07 LAB
ALBUMIN SERPL-MCNC: 4.9 G/DL (ref 3.8–4.8)
ALBUMIN/GLOB SERPL: 2 {RATIO} (ref 1.2–2.2)
ALP SERPL-CCNC: 78 IU/L (ref 44–121)
ALT SERPL-CCNC: 34 IU/L (ref 0–32)
AST SERPL-CCNC: 25 IU/L (ref 0–40)
BASOPHILS # BLD AUTO: 0 X10E3/UL (ref 0–0.2)
BASOPHILS NFR BLD AUTO: 1 %
BILIRUB SERPL-MCNC: 0.4 MG/DL (ref 0–1.2)
BUN SERPL-MCNC: 12 MG/DL (ref 6–20)
BUN/CREAT SERPL: 14 (ref 9–23)
CALCIUM SERPL-MCNC: 9.6 MG/DL (ref 8.7–10.2)
CHLORIDE SERPL-SCNC: 102 MMOL/L (ref 96–106)
CO2 SERPL-SCNC: 23 MMOL/L (ref 20–29)
CREAT SERPL-MCNC: 0.86 MG/DL (ref 0.57–1)
EGFRCR-CYS SERPLBLD CKD-EPI 2021: 92 ML/MIN/1.73
EOSINOPHIL # BLD AUTO: 0.1 X10E3/UL (ref 0–0.4)
EOSINOPHIL NFR BLD AUTO: 2 %
ERYTHROCYTE [DISTWIDTH] IN BLOOD BY AUTOMATED COUNT: 13.2 % (ref 11.7–15.4)
GLOBULIN SER CALC-MCNC: 2.5 G/DL (ref 1.5–4.5)
GLUCOSE SERPL-MCNC: 97 MG/DL (ref 65–99)
HCT VFR BLD AUTO: 38.5 % (ref 34–46.6)
HGB BLD-MCNC: 12.8 G/DL (ref 11.1–15.9)
IMM GRANULOCYTES # BLD AUTO: 0 X10E3/UL (ref 0–0.1)
IMM GRANULOCYTES NFR BLD AUTO: 0 %
LYMPHOCYTES # BLD AUTO: 2.1 X10E3/UL (ref 0.7–3.1)
LYMPHOCYTES NFR BLD AUTO: 28 %
MCH RBC QN AUTO: 30.5 PG (ref 26.6–33)
MCHC RBC AUTO-ENTMCNC: 33.2 G/DL (ref 31.5–35.7)
MCV RBC AUTO: 92 FL (ref 79–97)
MONOCYTES # BLD AUTO: 0.8 X10E3/UL (ref 0.1–0.9)
MONOCYTES NFR BLD AUTO: 10 %
NEUTROPHILS # BLD AUTO: 4.3 X10E3/UL (ref 1.4–7)
NEUTROPHILS NFR BLD AUTO: 59 %
PLATELET # BLD AUTO: 260 X10E3/UL (ref 150–450)
POTASSIUM SERPL-SCNC: 4.2 MMOL/L (ref 3.5–5.2)
PROT SERPL-MCNC: 7.4 G/DL (ref 6–8.5)
RBC # BLD AUTO: 4.2 X10E6/UL (ref 3.77–5.28)
SODIUM SERPL-SCNC: 142 MMOL/L (ref 134–144)
WBC # BLD AUTO: 7.3 X10E3/UL (ref 3.4–10.8)

## 2022-10-05 DIAGNOSIS — F39 MOOD DISORDER: ICD-10-CM

## 2022-10-05 NOTE — TELEPHONE ENCOUNTER
Rx Refill Note  Requested Prescriptions     Pending Prescriptions Disp Refills   • FLUoxetine (PROzac) 40 MG capsule [Pharmacy Med Name: FLUoxetine HCL 40 MG CAPSULE] 30 capsule      Sig: TAKE ONE CAPSULE BY MOUTH DAILY      Last office visit with prescribing clinician: 9/6/2022      Next office visit with prescribing clinician: 3/6/2023       {TIP  Please add Last Relevant Lab Date if appropriate: 09/06/22    Sonia Renner MA  10/05/22, 16:20 EDT

## 2022-10-07 RX ORDER — FLUOXETINE HYDROCHLORIDE 40 MG/1
CAPSULE ORAL
Qty: 90 CAPSULE | Refills: 1 | Status: SHIPPED | OUTPATIENT
Start: 2022-10-07

## 2023-01-24 ENCOUNTER — TELEPHONE (OUTPATIENT)
Dept: FAMILY MEDICINE CLINIC | Facility: CLINIC | Age: 33
End: 2023-01-24

## 2023-01-24 ENCOUNTER — OFFICE VISIT (OUTPATIENT)
Dept: FAMILY MEDICINE CLINIC | Facility: CLINIC | Age: 33
End: 2023-01-24
Payer: COMMERCIAL

## 2023-01-24 VITALS
BODY MASS INDEX: 37.21 KG/M2 | SYSTOLIC BLOOD PRESSURE: 136 MMHG | RESPIRATION RATE: 18 BRPM | TEMPERATURE: 98 F | HEIGHT: 63 IN | DIASTOLIC BLOOD PRESSURE: 78 MMHG | WEIGHT: 210 LBS | HEART RATE: 106 BPM | OXYGEN SATURATION: 98 %

## 2023-01-24 DIAGNOSIS — M21.70 INEQUALITY OF LENGTH OF LOWER EXTREMITY: ICD-10-CM

## 2023-01-24 DIAGNOSIS — R20.0 NUMBNESS: ICD-10-CM

## 2023-01-24 DIAGNOSIS — N92.6 ABNORMAL MENSES: ICD-10-CM

## 2023-01-24 DIAGNOSIS — R53.82 CHRONIC FATIGUE: Primary | ICD-10-CM

## 2023-01-24 DIAGNOSIS — L65.9 HAIR LOSS: ICD-10-CM

## 2023-01-24 DIAGNOSIS — M96.1 LUMBAR POST-LAMINECTOMY SYNDROME: ICD-10-CM

## 2023-01-24 PROCEDURE — 99214 OFFICE O/P EST MOD 30 MIN: CPT | Performed by: FAMILY MEDICINE

## 2023-01-24 RX ORDER — MELOXICAM 7.5 MG/1
7.5 TABLET ORAL DAILY
Qty: 30 TABLET | Refills: 0 | Status: SHIPPED | OUTPATIENT
Start: 2023-01-24

## 2023-01-24 NOTE — TELEPHONE ENCOUNTER
Caller: Ena Lyon    Relationship: Self    Best call back number: [phone]    What form or medical record are you requesting: WORK NOTE    Who is requesting this form or medical record from you: EMPLOYER    How would you like to receive the form or medical records (pick-up, mail, fax): [employer], MAIL IF NEEDED  If mail, what is the address: [id number] [place] [address]    Timeframe paperwork needed: ASAP    Additional notes: PATIENT STATED SHE WAS SEEN BY DR MCCARTY 01-, AND WILL NEED A LETTER FOR HER WORK SENT TO HER ON [employer], OR BY MAIL IF [employer] IS NOT AVAILABLE.    PLEASE CALL TO INFORM PATIENT WHEN THIS LETTER IS UPLOADED.

## 2023-01-24 NOTE — PROGRESS NOTES
Chief Complaint  Weight Gain, Alopecia (Hair loss ), and Fatigue (States present since covid infection 2021)    Subjective        Ena Lyon presents to [facility]  History of Present Illness  Weight gain  Feels she is gaining weight but has not really changed anything.   Last week she has had more back pain. Wanting to go back to PT. She does have hx of laminectomy and herniated disc.   She is having trouble walking. She finds PT has been good.   Thinks because she is in so much pain feels she has numbness and weakness. Has not fallen. Legs have not given out. No bowel or bladder incontinence.   Says feels like she has gained 20 lbs in the last year.   No real diet changes.   She had been doing 3-4 2.5 miles per week. Was also doing PT exercises at home and still doing worse.     Hair loss  Says she only washes her hair 2-3 times per week. And she loses a lot of hair. Says a 1/3 as thick as it used to be. Says thinner for years but the past year she really has had a lot of hair loss. Has tried different shampoos. No change in frequency of washing.     Fatigue  Ongoing for over one year.   Feels this is since she has had COVID infetiton.   She had COVID in 7/2021.  Her fatigue bothers her the most in that she can't do anything after work. Goes home and naps for 2 hours.   On the weekend in bed a lot, if up then she takes two naps per day.   She still gets about 7 hours of sleep at night. Feels exhausted, never wants to get out of bed in the morning. Doesn't want to get up and do things.   Says does feel depressed but feels this is from the fatigue. Nothing particular causing sadness or down feelings she just feels tired.     Says her period has almost gone away. Says only about one day of flow, very light. Has monthly. This has been going on for about 4 months.   Still takes her OCP.   Wondering about going off of this. Was on for heavy periods.     No palpitations, no SOB. Says  "gets dizzy sometimes. Good about hydration with water. Drinks black tea in the morning 1-2 cups.     Headaches still occurring but less frequent, about 1 bad headache per month.     Objective   Vital Signs:  /78 (BP Location: Right arm, Patient Position: Sitting, Cuff Size: Adult)   Pulse 106   Temp 98 °F (36.7 °C) (Infrared)   Resp 18   Ht 160 cm (63\")   Wt 95.3 kg (210 lb)   SpO2 98%   BMI 37.20 kg/m²   Estimated body mass index is 37.2 kg/m² as calculated from the following:    Height as of this encounter: 160 cm (63\").    Weight as of this encounter: 95.3 kg (210 lb).             Physical Exam  Vitals reviewed.   Constitutional:       General: She is not in acute distress.     Appearance: Normal appearance. She is not ill-appearing or toxic-appearing.   Eyes:      General: No scleral icterus.        Right eye: No discharge.         Left eye: No discharge.      Conjunctiva/sclera: Conjunctivae normal.   Neck:      Thyroid: No thyroid mass, thyromegaly or thyroid tenderness.   Cardiovascular:      Rate and Rhythm: Normal rate and regular rhythm.      Heart sounds: Normal heart sounds. No murmur heard.    No gallop.   Pulmonary:      Effort: Pulmonary effort is normal. No respiratory distress.      Breath sounds: Normal breath sounds. No wheezing, rhonchi or rales.   Musculoskeletal:      Cervical back: Neck supple. No muscular tenderness.      Right lower leg: No edema.      Left lower leg: No edema.   Lymphadenopathy:      Cervical: No cervical adenopathy.   Neurological:      General: No focal deficit present.      Mental Status: She is alert and oriented to person, place, and time.      Motor: No abnormal muscle tone.   Psychiatric:         Mood and Affect: Mood normal.         Behavior: Behavior normal.        Result Review :                   Assessment and Plan   Diagnoses and all orders for this visit:    1. Chronic fatigue (Primary)  -     CBC & Differential  -     Comprehensive Metabolic " Panel  -     Vitamin B12  -     TSH  -     T4    2. Hair loss  -     CBC & Differential  -     Comprehensive Metabolic Panel  -     Vitamin B12  -     TSH  -     T4    3. Abnormal menses  -     CBC & Differential  -     Comprehensive Metabolic Panel  -     Vitamin B12  -     TSH  -     T4    4. Lumbar post-laminectomy syndrome  -     Ambulatory Referral to Physical Therapy    5. Inequality of length of lower extremity  -     Ambulatory Referral to Physical Therapy    6. Numbness  -     CBC & Differential  -     Comprehensive Metabolic Panel  -     Vitamin B12  -     TSH  -     T4    Other orders  -     meloxicam (Mobic) 7.5 MG tablet; Take 1 tablet by mouth Daily.  Dispense: 30 tablet; Refill: 0             Follow Up   No follow-ups on file.  Patient was given instructions and counseling regarding her condition or for health maintenance advice. Please see specific information pulled into the AVS if appropriate.     Many symptoms of hypothyroid, check labs. Less bleeding but will make sure she does not have anemia.  On B vitamin, check B12 given numbness and fatigue.   Restart vitamin D low dose.     She did not have a fall or injury. Says just the way her back is. She also has leg length discrepancy. Needs new shoes.   She would like to go to PT.   Ibuprofen is not helping her pain.   She has been on prescription anti-inflammatory and we will do short course. Can take for 7-10 days then use prn. Take with food and water. Going to give meloxicam. She has used with relief previously.   She is by [facility]. Wants to go somewhere other than results physio.   Wondering about podiatry Dr. Shen.     If all is normal would consider to see how she is doing with PT and improvement of pain to help with energy and consider changes to medication for mood.

## 2023-01-25 LAB
ALBUMIN SERPL-MCNC: 4.6 G/DL (ref 3.5–5.2)
ALBUMIN/GLOB SERPL: 1.8 G/DL
ALP SERPL-CCNC: 76 U/L (ref 39–117)
ALT SERPL-CCNC: 32 U/L (ref 1–33)
AST SERPL-CCNC: 21 U/L (ref 1–32)
BASOPHILS # BLD AUTO: 0.05 10*3/MM3 (ref 0–0.2)
BASOPHILS NFR BLD AUTO: 0.9 % (ref 0–1.5)
BILIRUB SERPL-MCNC: 0.6 MG/DL (ref 0–1.2)
BUN SERPL-MCNC: 11 MG/DL (ref 6–20)
BUN/CREAT SERPL: 9.8 (ref 7–25)
CALCIUM SERPL-MCNC: 9.7 MG/DL (ref 8.6–10.5)
CHLORIDE SERPL-SCNC: 103 MMOL/L (ref 98–107)
CO2 SERPL-SCNC: 29.6 MMOL/L (ref 22–29)
CREAT SERPL-MCNC: 1.12 MG/DL (ref 0.57–1)
EGFRCR SERPLBLD CKD-EPI 2021: 67.1 ML/MIN/1.73
EOSINOPHIL # BLD AUTO: 0.1 10*3/MM3 (ref 0–0.4)
EOSINOPHIL NFR BLD AUTO: 1.8 % (ref 0.3–6.2)
ERYTHROCYTE [DISTWIDTH] IN BLOOD BY AUTOMATED COUNT: 13.1 % (ref 12.3–15.4)
GLOBULIN SER CALC-MCNC: 2.6 GM/DL
GLUCOSE SERPL-MCNC: 82 MG/DL (ref 65–99)
HCT VFR BLD AUTO: 39.2 % (ref 34–46.6)
HGB BLD-MCNC: 13 G/DL (ref 12–15.9)
IMM GRANULOCYTES # BLD AUTO: 0.01 10*3/MM3 (ref 0–0.05)
IMM GRANULOCYTES NFR BLD AUTO: 0.2 % (ref 0–0.5)
LYMPHOCYTES # BLD AUTO: 1.42 10*3/MM3 (ref 0.7–3.1)
LYMPHOCYTES NFR BLD AUTO: 25.5 % (ref 19.6–45.3)
MCH RBC QN AUTO: 30.1 PG (ref 26.6–33)
MCHC RBC AUTO-ENTMCNC: 33.2 G/DL (ref 31.5–35.7)
MCV RBC AUTO: 90.7 FL (ref 79–97)
MONOCYTES # BLD AUTO: 0.62 10*3/MM3 (ref 0.1–0.9)
MONOCYTES NFR BLD AUTO: 11.2 % (ref 5–12)
NEUTROPHILS # BLD AUTO: 3.36 10*3/MM3 (ref 1.7–7)
NEUTROPHILS NFR BLD AUTO: 60.4 % (ref 42.7–76)
NRBC BLD AUTO-RTO: 0 /100 WBC (ref 0–0.2)
PLATELET # BLD AUTO: 275 10*3/MM3 (ref 140–450)
POTASSIUM SERPL-SCNC: 4.1 MMOL/L (ref 3.5–5.2)
PROT SERPL-MCNC: 7.2 G/DL (ref 6–8.5)
RBC # BLD AUTO: 4.32 10*6/MM3 (ref 3.77–5.28)
SODIUM SERPL-SCNC: 142 MMOL/L (ref 136–145)
T4 SERPL-MCNC: 9.35 MCG/DL (ref 4.5–11.7)
TSH SERPL DL<=0.005 MIU/L-ACNC: 2.24 UIU/ML (ref 0.27–4.2)
VIT B12 SERPL-MCNC: 451 PG/ML (ref 211–946)
WBC # BLD AUTO: 5.56 10*3/MM3 (ref 3.4–10.8)

## 2023-02-21 RX ORDER — BUSPIRONE HYDROCHLORIDE 10 MG/1
TABLET ORAL
Qty: 90 TABLET | Refills: 1 | Status: SHIPPED | OUTPATIENT
Start: 2023-02-21

## 2023-02-21 NOTE — TELEPHONE ENCOUNTER
Rx Refill Note  Requested Prescriptions     Pending Prescriptions Disp Refills   • busPIRone (BUSPAR) 10 MG tablet [Pharmacy Med Name: busPIRone HCL 10 MG TABLET] 90 tablet 1     Sig: TAKE ONE TABLET BY MOUTH THREE TIMES A DAY      Last office visit with prescribing clinician: 1/24/2023   Last telemedicine visit with prescribing clinician: 3/6/2023   Next office visit with prescribing clinician: 3/6/2023                         Would you like a call back once the refill request has been completed: [] Yes [] No    If the office needs to give you a call back, can they leave a voicemail: [] Yes [] No    Poly Zuñiga LPN  02/21/23, 08:07 EST

## 2023-03-03 ENCOUNTER — TELEPHONE (OUTPATIENT)
Dept: FAMILY MEDICINE CLINIC | Facility: CLINIC | Age: 33
End: 2023-03-03

## 2023-03-03 NOTE — TELEPHONE ENCOUNTER
Hub staff attempted to follow warm transfer process and was unsuccessful     Caller: Ena Lyon    Relationship to patient: Self    Best call back number: [phone]    Patient is needing:  PATIENT RETURNING CALL TO [facility], STATES SHE CAN DO A MY CHART VIDEO VISIT FOR Monday WITH DR MCCARTY, PLEASE CALL BACK TO CHANGE.

## 2023-03-06 ENCOUNTER — TELEMEDICINE (OUTPATIENT)
Dept: FAMILY MEDICINE CLINIC | Facility: CLINIC | Age: 33
End: 2023-03-06
Payer: COMMERCIAL

## 2023-03-06 DIAGNOSIS — B35.1 ONYCHOMYCOSIS: ICD-10-CM

## 2023-03-06 DIAGNOSIS — R53.82 CHRONIC FATIGUE: Primary | ICD-10-CM

## 2023-03-06 DIAGNOSIS — E53.8 LOW SERUM VITAMIN B12: ICD-10-CM

## 2023-03-06 PROCEDURE — 99213 OFFICE O/P EST LOW 20 MIN: CPT | Performed by: FAMILY MEDICINE

## 2023-03-06 RX ORDER — TERBINAFINE HYDROCHLORIDE 250 MG/1
250 TABLET ORAL DAILY
Qty: 90 TABLET | Refills: 1 | Status: SHIPPED | OUTPATIENT
Start: 2023-03-06

## 2023-03-06 RX ORDER — NORETHINDRONE ACETATE AND ETHINYL ESTRADIOL, ETHINYL ESTRADIOL AND FERROUS FUMARATE 1MG-10(24)
KIT ORAL
COMMUNITY
Start: 2023-02-09

## 2023-03-06 RX ORDER — CYANOCOBALAMIN (VITAMIN B-12) 1000 MCG
TABLET ORAL
COMMUNITY
Start: 2023-02-22

## 2023-03-06 NOTE — PROGRESS NOTES
"Chief Complaint  No chief complaint on file.    Subjective        Ena Lyon presents to [facility]  History of Present Illness  She has not gone for PT. Says she has had a lot going on.   Did not have time. Contacted friend for personal recommendation. Waiting to get in with [facility] that she prefers. She is still working on a plan.     Says the toenail is not really better. Using topical. Wondering what to try next.     She is on lo-loestrin. Saw her gyn Dr. Maday Hodges. Changed from Antoinette to this. She said she may also get another gynecologist. She was changed to help with fatigue and help with the weight.     Objective   Vital Signs:  There were no vitals taken for this visit.  Estimated body mass index is 37.2 kg/m² as calculated from the following:    Height as of 1/24/23: 160 cm (63\").    Weight as of 1/24/23: 95.3 kg (210 lb).       Class 2 Severe Obesity (BMI >=35 and <=39.9). Obesity-related health conditions include the following: obstructive sleep apnea and hypertension. Obesity is unchanged. BMI is is above average; BMI management plan is completed. We discussed portion control and increasing exercise.      Physical Exam  Constitutional:       General: She is not in acute distress.     Appearance: Normal appearance. She is not ill-appearing or toxic-appearing.   Eyes:      General: No scleral icterus.     Conjunctiva/sclera: Conjunctivae normal.   Pulmonary:      Effort: Pulmonary effort is normal. No respiratory distress.   Neurological:      Mental Status: She is alert and oriented to person, place, and time.   Psychiatric:         Behavior: Behavior normal.        Result Review :                   Assessment and Plan   Diagnoses and all orders for this visit:    1. Chronic fatigue (Primary)    2. Low serum vitamin B12    3. Onychomycosis  -     Comprehensive Metabolic Panel; Future    Other orders  -     terbinafine (lamiSIL) 250 MG tablet; Take 1 tablet by mouth Daily.  " Dispense: 90 tablet; Refill: 1             Follow Up   No follow-ups on file.  Patient was given instructions and counseling regarding her condition or for health maintenance advice. Please see specific information pulled into the AVS if appropriate.     Feels the fatigue may have been impacted a lot by seasonal depression. Says she is feeling a lot better with the weather change and some days with sun.   She is taking 500 mcg of B12 daily.   She is also on the vitamin D.   These may be helping a little too.     Discussed the gynecology options. Recommended [facility]    Going to order oral anti-fungal and she will call to make lab visit for one month to check liver labs.     Patient gave consent today for a telehealth video visit. provider and pt both located at homes in [place], [place].    12 min was spent in discussion with pt and greater than 50% of that time was spent counseling.

## 2023-04-13 DIAGNOSIS — F39 MOOD DISORDER: ICD-10-CM

## 2023-04-13 RX ORDER — FLUOXETINE HYDROCHLORIDE 40 MG/1
CAPSULE ORAL
Qty: 90 CAPSULE | Refills: 3 | Status: SHIPPED | OUTPATIENT
Start: 2023-04-13

## 2023-05-09 RX ORDER — RIZATRIPTAN BENZOATE 10 MG/1
TABLET ORAL
Qty: 6 TABLET | Refills: 1 | Status: SHIPPED | OUTPATIENT
Start: 2023-05-09

## 2023-08-31 ENCOUNTER — OFFICE VISIT (OUTPATIENT)
Dept: FAMILY MEDICINE CLINIC | Facility: CLINIC | Age: 33
End: 2023-08-31
Payer: COMMERCIAL

## 2023-08-31 VITALS
HEART RATE: 97 BPM | SYSTOLIC BLOOD PRESSURE: 138 MMHG | HEIGHT: 63 IN | TEMPERATURE: 97.5 F | OXYGEN SATURATION: 99 % | BODY MASS INDEX: 37.74 KG/M2 | WEIGHT: 213 LBS | DIASTOLIC BLOOD PRESSURE: 88 MMHG

## 2023-08-31 DIAGNOSIS — R11.0 NAUSEA: ICD-10-CM

## 2023-08-31 DIAGNOSIS — Z00.00 ROUTINE GENERAL MEDICAL EXAMINATION AT A HEALTH CARE FACILITY: Primary | ICD-10-CM

## 2023-08-31 DIAGNOSIS — R63.5 WEIGHT GAIN: ICD-10-CM

## 2023-08-31 PROCEDURE — 99395 PREV VISIT EST AGE 18-39: CPT | Performed by: NURSE PRACTITIONER

## 2023-08-31 RX ORDER — OMEPRAZOLE 40 MG/1
40 CAPSULE, DELAYED RELEASE ORAL DAILY
Qty: 30 CAPSULE | Refills: 1 | Status: SHIPPED | OUTPATIENT
Start: 2023-08-31

## 2023-08-31 NOTE — PROGRESS NOTES
"Chief Complaint  Establish Care (New pt, f/u foot fungus, c/o abdominal pain, constipation, nausea, bloating, pt requesting referral for GYN and Ortho )    Subjective        Ena Lyon presents to [facility]  History of Present Illness patient here to establish care for physical.  Previous patient of Dr. Rose.    Patient has been on Loestrin and feels like it may be causing her to gain weight.  She has had weight gain without changes in her lifestyle.  She is not sexually active.  Has not had periods with Loestrin.  She was put on this for heavy periods and has been on it for \"most of her life\".  Period was regular, monthly.    She has chronic nail fungus and been on Lamisil without improvement.  Not sure what next steps are.    She has some chronic abdominal pains associated with intermittent constipation, nausea, bloating.  She gets a bad stomachache mostly in the afternoon regardless of what she eats.  Sometimes occurs after lunch.  Typically eats clean diet, vegetarian, solids and beings usually.  When she is about 5 to 6 hours without food she starts getting nauseous.  No acid reflux.  No associated foods.  Occurs with or without bowel movement.  She does take quite a few supplements.  She takes elderberry for her immune system.  Tries to divide them up and takes two thirds of them in the morning and about a third at at bedtime.    She has pretty stable migraines.  Gets about 1 to 2/month.  She does not get an aura.  She does have some nausea and occasionally vomits.  Occasionally needs to redose that rizatriptan.  Unknown triggers.    Admits some random dizziness not associated with movement or migraine.  Typically resolves after a few seconds.    Denies family history of breast colon, ovarian cancer.    Lives at home with mom and this is going well.    She has been on Prozac and BuSpar for chronic anxiety which seems to work well.    PHQ-9 Total Score: PHQ-9: Brief Depression " "Severity Measure Score: 11  RICHARD-7 Score: RICHARD 7 Total Score: 7    A review of systems was performed, and the pertinent positives are noted in the HPI.     Objective   Vital Signs:  /88   Pulse 97   Temp 97.5 °F (36.4 °C)   Ht 160 cm (63\")   Wt 96.6 kg (213 lb)   SpO2 99%   BMI 37.73 kg/m²   Estimated body mass index is 37.73 kg/m² as calculated from the following:    Height as of this encounter: 160 cm (63\").    Weight as of this encounter: 96.6 kg (213 lb).               Physical Exam  Vitals and nursing note reviewed.   Constitutional:       General: She is not in acute distress.     Appearance: She is well-developed. She is not ill-appearing.   HENT:      Head: Normocephalic and atraumatic.      Right Ear: Tympanic membrane, ear canal and external ear normal.      Left Ear: Tympanic membrane, ear canal and external ear normal.      Mouth/Throat:      Mouth: Mucous membranes are moist.      Pharynx: Uvula midline. No posterior oropharyngeal erythema.   Eyes:      General: No scleral icterus.        Right eye: No discharge.         Left eye: No discharge.      Conjunctiva/sclera: Conjunctivae normal.   Neck:      Thyroid: No thyromegaly.   Cardiovascular:      Rate and Rhythm: Normal rate and regular rhythm.      Heart sounds: Normal heart sounds. No murmur heard.  Pulmonary:      Effort: Pulmonary effort is normal.      Breath sounds: Normal breath sounds.   Abdominal:      General: Bowel sounds are normal.      Palpations: Abdomen is soft.      Tenderness: There is no abdominal tenderness.   Musculoskeletal:         General: No deformity.      Cervical back: Neck supple.      Comments: Gait smooth and steady   Lymphadenopathy:      Cervical: No cervical adenopathy.   Skin:     General: Skin is warm and dry.   Neurological:      General: No focal deficit present.      Mental Status: She is alert and oriented to person, place, and time.   Psychiatric:         Mood and Affect: Mood normal.         " Behavior: Behavior normal.         Thought Content: Thought content normal.      Result Review :                   Assessment and Plan   Diagnoses and all orders for this visit:    1. Routine general medical examination at a health care facility (Primary)  -     Ambulatory Referral to Gynecology  -     CBC & Differential  -     Comprehensive Metabolic Panel  -     Hemoglobin A1c  -     Iron Profile  -     TSH Rfx On Abnormal To Free T4    2. Weight gain    3. Nausea  -     omeprazole (priLOSEC) 40 MG capsule; Take 1 capsule by mouth Daily.  Dispense: 30 capsule; Refill: 1    Appropriate health maintenance and prevention topics specific for this patient were discussed today.  Additionally, health goals, and health concerns addressed as appropriate.  Pt was encouraged to stay up to date on recommended screenings and vaccines based on USPSTF guidelines.     We will refer to GYN for other options for contraceptives, period regulation.     Nausea: Sounds like it may be due to acid reflux.  We will do a trial of PPI for 1 month and have patient follow-up afterwards to see if helpful.  She is on quite a few supplements which may be aggravating her stomach.  Recommend stopping them until symptoms better and then gradually restarting them.    Migraines seem to be pretty well controlled with current medication.  We will continue this.  No refills needed today.    Anxiety and depression: PHQ and RICHARD are relatively high for medicated patient.  We will continue current meds but may need to reevaluate how helpful they are.           Follow Up   No follow-ups on file.  Patient was given instructions and counseling regarding her condition or for health maintenance advice. Please see specific information pulled into the AVS if appropriate.

## 2023-09-01 ENCOUNTER — PATIENT ROUNDING (BHMG ONLY) (OUTPATIENT)
Dept: FAMILY MEDICINE CLINIC | Facility: CLINIC | Age: 33
End: 2023-09-01
Payer: COMMERCIAL

## 2023-09-01 LAB
ALBUMIN SERPL-MCNC: 4.9 G/DL (ref 3.5–5.2)
ALBUMIN/GLOB SERPL: 2 G/DL
ALP SERPL-CCNC: 85 U/L (ref 39–117)
ALT SERPL-CCNC: 16 U/L (ref 1–33)
AST SERPL-CCNC: 16 U/L (ref 1–32)
BASOPHILS # BLD AUTO: 0.03 10*3/MM3 (ref 0–0.2)
BASOPHILS NFR BLD AUTO: 0.5 % (ref 0–1.5)
BILIRUB SERPL-MCNC: 0.4 MG/DL (ref 0–1.2)
BUN SERPL-MCNC: 9 MG/DL (ref 6–20)
BUN/CREAT SERPL: 9.5 (ref 7–25)
CALCIUM SERPL-MCNC: 9.8 MG/DL (ref 8.6–10.5)
CHLORIDE SERPL-SCNC: 102 MMOL/L (ref 98–107)
CO2 SERPL-SCNC: 25.9 MMOL/L (ref 22–29)
CREAT SERPL-MCNC: 0.95 MG/DL (ref 0.57–1)
EGFRCR SERPLBLD CKD-EPI 2021: 81.3 ML/MIN/1.73
EOSINOPHIL # BLD AUTO: 0.09 10*3/MM3 (ref 0–0.4)
EOSINOPHIL NFR BLD AUTO: 1.6 % (ref 0.3–6.2)
ERYTHROCYTE [DISTWIDTH] IN BLOOD BY AUTOMATED COUNT: 13.4 % (ref 12.3–15.4)
GLOBULIN SER CALC-MCNC: 2.4 GM/DL
GLUCOSE SERPL-MCNC: 110 MG/DL (ref 65–99)
HBA1C MFR BLD: 5.4 % (ref 4.8–5.6)
HCT VFR BLD AUTO: 38.2 % (ref 34–46.6)
HGB BLD-MCNC: 12.9 G/DL (ref 12–15.9)
IMM GRANULOCYTES # BLD AUTO: 0.01 10*3/MM3 (ref 0–0.05)
IMM GRANULOCYTES NFR BLD AUTO: 0.2 % (ref 0–0.5)
IRON SATN MFR SERPL: 22 % (ref 20–50)
IRON SERPL-MCNC: 101 MCG/DL (ref 37–145)
LYMPHOCYTES # BLD AUTO: 1.48 10*3/MM3 (ref 0.7–3.1)
LYMPHOCYTES NFR BLD AUTO: 26.9 % (ref 19.6–45.3)
MCH RBC QN AUTO: 30.3 PG (ref 26.6–33)
MCHC RBC AUTO-ENTMCNC: 33.8 G/DL (ref 31.5–35.7)
MCV RBC AUTO: 89.7 FL (ref 79–97)
MONOCYTES # BLD AUTO: 0.65 10*3/MM3 (ref 0.1–0.9)
MONOCYTES NFR BLD AUTO: 11.8 % (ref 5–12)
NEUTROPHILS # BLD AUTO: 3.25 10*3/MM3 (ref 1.7–7)
NEUTROPHILS NFR BLD AUTO: 59 % (ref 42.7–76)
NRBC BLD AUTO-RTO: 0 /100 WBC (ref 0–0.2)
PLATELET # BLD AUTO: 242 10*3/MM3 (ref 140–450)
POTASSIUM SERPL-SCNC: 4 MMOL/L (ref 3.5–5.2)
PROT SERPL-MCNC: 7.3 G/DL (ref 6–8.5)
RBC # BLD AUTO: 4.26 10*6/MM3 (ref 3.77–5.28)
SODIUM SERPL-SCNC: 140 MMOL/L (ref 136–145)
TIBC SERPL-MCNC: 454 MCG/DL
TSH SERPL DL<=0.005 MIU/L-ACNC: 2.3 UIU/ML (ref 0.27–4.2)
UIBC SERPL-MCNC: 353 MCG/DL (ref 112–346)
WBC # BLD AUTO: 5.51 10*3/MM3 (ref 3.4–10.8)

## 2023-09-01 NOTE — PROGRESS NOTES
A My-Chart message has been sent to the patient for PATIENT ROUNDING with [facility]

## 2023-10-03 RX ORDER — BUSPIRONE HYDROCHLORIDE 10 MG/1
10 TABLET ORAL 3 TIMES DAILY
Qty: 180 TABLET | Refills: 1 | Status: SHIPPED | OUTPATIENT
Start: 2023-10-03

## 2023-10-05 ENCOUNTER — OFFICE VISIT (OUTPATIENT)
Dept: FAMILY MEDICINE CLINIC | Facility: CLINIC | Age: 33
End: 2023-10-05
Payer: COMMERCIAL

## 2023-10-05 VITALS
SYSTOLIC BLOOD PRESSURE: 138 MMHG | HEART RATE: 95 BPM | TEMPERATURE: 97.5 F | HEIGHT: 66 IN | OXYGEN SATURATION: 98 % | DIASTOLIC BLOOD PRESSURE: 94 MMHG | BODY MASS INDEX: 34.39 KG/M2 | WEIGHT: 214 LBS

## 2023-10-05 DIAGNOSIS — G43.009 MIGRAINE WITHOUT AURA AND WITHOUT STATUS MIGRAINOSUS, NOT INTRACTABLE: Primary | ICD-10-CM

## 2023-10-05 DIAGNOSIS — Z23 FLU VACCINE NEED: ICD-10-CM

## 2023-10-05 DIAGNOSIS — R11.0 NAUSEA: ICD-10-CM

## 2023-10-05 NOTE — PROGRESS NOTES
"Chief Complaint  GI Problem (F/u nausea, pt states still having nausea )    Subjective        Ena Lyon presents to [facility]  History of Present Illness patient is here for follow-up on nausea and migraines.  She is still having some nausea with her migraines.  Constipation and bloating has seem to be improved with smaller more frequent meals.  She has been taking magnesium oxide twice per day and feels like it has been helpful with migraine prophylaxis as well.    More migraines this month-3-4 where she needed to redose-character has not changed.  She has been keeping a journal and has not been able to identify triggers for this.    After discussion she overall thinks everything is stable and does not think she needs to make any changes in her current medications.    She wonders if there is a connection and Asperger and nausea.    Objective   Vital Signs:  /94   Pulse 95   Temp 97.5 °F (36.4 °C)   Ht 167.6 cm (66\")   Wt 97.1 kg (214 lb)   SpO2 98%   BMI 34.54 kg/m²   Estimated body mass index is 34.54 kg/m² as calculated from the following:    Height as of this encounter: 167.6 cm (66\").    Weight as of this encounter: 97.1 kg (214 lb).               Physical Exam  Vitals and nursing note reviewed.   Constitutional:       General: She is not in acute distress.     Appearance: She is well-developed. She is obese. She is not ill-appearing or diaphoretic.   HENT:      Head: Normocephalic and atraumatic.   Eyes:      General:         Right eye: No discharge.         Left eye: No discharge.      Conjunctiva/sclera: Conjunctivae normal.   Cardiovascular:      Rate and Rhythm: Normal rate and regular rhythm.      Heart sounds: Normal heart sounds.   Pulmonary:      Effort: Pulmonary effort is normal.      Breath sounds: Normal breath sounds.   Abdominal:      General: Bowel sounds are normal.      Palpations: Abdomen is soft.      Tenderness: There is no abdominal " tenderness.   Musculoskeletal:         General: No deformity.      Comments: Gait smooth and steady   Skin:     General: Skin is warm and dry.   Neurological:      Mental Status: She is alert and oriented to person, place, and time.   Psychiatric:         Mood and Affect: Mood normal.         Behavior: Behavior normal.      Result Review :                   Assessment and Plan   Diagnoses and all orders for this visit:    1. Migraine without aura and without status migrainosus, not intractable (Primary)    2. Nausea    3. Flu vaccine need  -     Fluzone >6 Months ([id number])    Migraines have increased a little bit but may be due to weather changes which we discussed.  Recommend humidifier in bedroom to see if this is helpful.  If not will let me know.    Chronic nausea does seem to be better and I did discuss with patient that I do not think ASD causes nausea but it may make her more focused on the symptoms.  She does feel like they are overall improving and declines any further work-up at this time and will let me know if not continuing to improve.         Follow Up   No follow-ups on file.  Patient was given instructions and counseling regarding her condition or for health maintenance advice. Please see specific information pulled into the AVS if appropriate.       Answers submitted by the patient for this visit:  Other (Submitted on 10/3/2023)  Please describe your symptoms.: Weight gain, tummy upset  Have you had these symptoms before?: Yes  How long have you been having these symptoms?: Greater than 2 weeks  Please list any medications you are currently taking for this condition.: Omeprazole  Primary Reason for Visit (Submitted on 10/3/2023)  What is the primary reason for your visit?: Other

## 2023-10-28 ENCOUNTER — PATIENT MESSAGE (OUTPATIENT)
Dept: FAMILY MEDICINE CLINIC | Facility: CLINIC | Age: 33
End: 2023-10-28
Payer: COMMERCIAL

## 2023-10-30 DIAGNOSIS — R11.0 NAUSEA: ICD-10-CM

## 2023-10-30 RX ORDER — OMEPRAZOLE 40 MG/1
40 CAPSULE, DELAYED RELEASE ORAL DAILY
Qty: 30 CAPSULE | Refills: 1 | Status: SHIPPED | OUTPATIENT
Start: 2023-10-30

## 2023-10-30 RX ORDER — RIZATRIPTAN BENZOATE 10 MG/1
10 TABLET ORAL ONCE
Qty: 6 TABLET | Refills: 1 | Status: SHIPPED | OUTPATIENT
Start: 2023-10-30 | End: 2023-11-03 | Stop reason: SDUPTHER

## 2023-10-30 NOTE — TELEPHONE ENCOUNTER
Rx Refill Note  Requested Prescriptions     Pending Prescriptions Disp Refills    omeprazole (priLOSEC) 40 MG capsule [Pharmacy Med Name: OMEPRAZOLE DR 40 MG CAPSULE] 30 capsule 1     Sig: TAKE 1 CAPSULE BY MOUTH DAILY      Last office visit with prescribing clinician: 10/5/2023   Last telemedicine visit with prescribing clinician: Visit date not found   Next office visit with prescribing clinician: Visit date not found                         Would you like a call back once the refill request has been completed: [] Yes [] No    If the office needs to give you a call back, can they leave a voicemail: [] Yes [] No    Mary Romo MA  10/30/23, 15:49 EDT

## 2023-10-30 NOTE — TELEPHONE ENCOUNTER
From: Ena Lyon  To: Arabella Rodriguez  Sent: 10/28/2023 3:12 PM EDT  Subject: Rizatriptan refill    This medication is out of refills, could you write a new prescription? Thanks!

## 2023-10-30 NOTE — TELEPHONE ENCOUNTER
Rx Refill Note  Requested Prescriptions     Pending Prescriptions Disp Refills    rizatriptan (MAXALT) 10 MG tablet 6 tablet 1     Sig: Take 1 tablet by mouth 1 (One) Time for 1 dose. AT ONSET OF HEADACHE MAY REPEAT ONE TABLET IN 2 HOURS IF NEEDED      Last office visit with prescribing clinician: 10/5/2023   Last telemedicine visit with prescribing clinician: Visit date not found   Next office visit with prescribing clinician: 10/30/2023                         Would you like a call back once the refill request has been completed: [] Yes [] No    If the office needs to give you a call back, can they leave a voicemail: [] Yes [] No    Poly Zuñiga LPN  10/30/23, 07:49 EDT

## 2023-11-03 RX ORDER — RIZATRIPTAN BENZOATE 10 MG/1
10 TABLET ORAL ONCE
Qty: 12 TABLET | Refills: 2 | Status: SHIPPED | OUTPATIENT
Start: 2023-11-03 | End: 2023-11-03

## 2024-01-04 DIAGNOSIS — R11.0 NAUSEA: ICD-10-CM

## 2024-01-05 RX ORDER — OMEPRAZOLE 40 MG/1
40 CAPSULE, DELAYED RELEASE ORAL DAILY
Qty: 30 CAPSULE | Refills: 1 | Status: SHIPPED | OUTPATIENT
Start: 2024-01-05

## 2024-03-11 DIAGNOSIS — R11.0 NAUSEA: ICD-10-CM

## 2024-03-11 NOTE — TELEPHONE ENCOUNTER
Rx Refill Note  Requested Prescriptions     Pending Prescriptions Disp Refills    omeprazole (priLOSEC) 40 MG capsule [Pharmacy Med Name: OMEPRAZOLE DR 40 MG CAPSULE] 30 capsule 1     Sig: TAKE 1 CAPSULE BY MOUTH DAILY      Last office visit with prescribing clinician: 10/5/2023   Last telemedicine visit with prescribing clinician: Visit date not found   Next office visit with prescribing clinician: Visit date not found                         Would you like a call back once the refill request has been completed: [] Yes [] No    If the office needs to give you a call back, can they leave a voicemail: [] Yes [] No    Chucky Batres MA  03/11/24, 11:42 EDT

## 2024-03-12 RX ORDER — OMEPRAZOLE 40 MG/1
40 CAPSULE, DELAYED RELEASE ORAL DAILY
Qty: 30 CAPSULE | Refills: 1 | Status: SHIPPED | OUTPATIENT
Start: 2024-03-12

## 2024-04-09 RX ORDER — RIZATRIPTAN BENZOATE 10 MG/1
TABLET ORAL
Qty: 12 TABLET | Refills: 1 | Status: SHIPPED | OUTPATIENT
Start: 2024-04-09

## 2024-04-12 DIAGNOSIS — F39 MOOD DISORDER: ICD-10-CM

## 2024-04-12 NOTE — TELEPHONE ENCOUNTER
Rx Refill Note  Requested Prescriptions     Pending Prescriptions Disp Refills    FLUoxetine (PROzac) 40 MG capsule 90 capsule 3     Sig: Take 1 capsule by mouth Daily.      Last office visit with prescribing clinician: 10/5/2023   Last telemedicine visit with prescribing clinician: Visit date not found   Next office visit with prescribing clinician: Visit date not found                         Would you like a call back once the refill request has been completed: [] Yes [] No    If the office needs to give you a call back, can they leave a voicemail: [] Yes [] No    Tyrone Metzger Rep  04/12/24, 15:51 EDT

## 2024-04-15 RX ORDER — FLUOXETINE HYDROCHLORIDE 40 MG/1
40 CAPSULE ORAL DAILY
Qty: 90 CAPSULE | Refills: 3 | Status: SHIPPED | OUTPATIENT
Start: 2024-04-15

## 2024-09-18 ENCOUNTER — OFFICE VISIT (OUTPATIENT)
Dept: FAMILY MEDICINE CLINIC | Facility: CLINIC | Age: 34
End: 2024-09-18
Payer: COMMERCIAL

## 2024-09-18 VITALS
BODY MASS INDEX: 33.07 KG/M2 | SYSTOLIC BLOOD PRESSURE: 128 MMHG | HEIGHT: 66 IN | TEMPERATURE: 99.3 F | HEART RATE: 91 BPM | DIASTOLIC BLOOD PRESSURE: 100 MMHG | OXYGEN SATURATION: 98 % | WEIGHT: 205.8 LBS

## 2024-09-18 DIAGNOSIS — J06.9 ACUTE URI: Primary | ICD-10-CM

## 2024-09-18 LAB
EXPIRATION DATE: NORMAL
FLUAV AG NPH QL: NEGATIVE
FLUBV AG NPH QL: NEGATIVE
INTERNAL CONTROL: NORMAL
Lab: NORMAL
S PYO AG THROAT QL: NEGATIVE
SARS-COV-2 AG UPPER RESP QL IA.RAPID: NOT DETECTED

## 2024-09-18 RX ORDER — BROMPHENIRAMINE MALEATE, PSEUDOEPHEDRINE HYDROCHLORIDE, AND DEXTROMETHORPHAN HYDROBROMIDE 2; 30; 10 MG/5ML; MG/5ML; MG/5ML
10 SYRUP ORAL 4 TIMES DAILY PRN
Qty: 240 ML | Refills: 0 | Status: SHIPPED | OUTPATIENT
Start: 2024-09-18

## 2024-09-20 ENCOUNTER — OFFICE VISIT (OUTPATIENT)
Dept: FAMILY MEDICINE CLINIC | Facility: CLINIC | Age: 34
End: 2024-09-20
Payer: COMMERCIAL

## 2024-09-20 VITALS
SYSTOLIC BLOOD PRESSURE: 126 MMHG | OXYGEN SATURATION: 98 % | RESPIRATION RATE: 16 BRPM | DIASTOLIC BLOOD PRESSURE: 80 MMHG | HEIGHT: 66 IN | BODY MASS INDEX: 32.95 KG/M2 | WEIGHT: 205 LBS | HEART RATE: 112 BPM

## 2024-09-20 DIAGNOSIS — R06.02 SHORTNESS OF BREATH: Primary | ICD-10-CM

## 2024-09-20 DIAGNOSIS — H66.003 NON-RECURRENT ACUTE SUPPURATIVE OTITIS MEDIA OF BOTH EARS WITHOUT SPONTANEOUS RUPTURE OF TYMPANIC MEMBRANES: ICD-10-CM

## 2024-09-20 LAB
EXPIRATION DATE: NORMAL
INTERNAL CONTROL: NORMAL
Lab: NORMAL
SARS-COV-2 AG UPPER RESP QL IA.RAPID: NOT DETECTED

## 2024-09-20 PROCEDURE — 87426 SARSCOV CORONAVIRUS AG IA: CPT | Performed by: FAMILY MEDICINE

## 2024-09-20 PROCEDURE — 96372 THER/PROPH/DIAG INJ SC/IM: CPT | Performed by: FAMILY MEDICINE

## 2024-09-20 PROCEDURE — 99213 OFFICE O/P EST LOW 20 MIN: CPT | Performed by: FAMILY MEDICINE

## 2024-09-20 RX ORDER — CEFTRIAXONE SODIUM 250 MG/1
1000 INJECTION, POWDER, FOR SOLUTION INTRAMUSCULAR; INTRAVENOUS ONCE
Status: COMPLETED | OUTPATIENT
Start: 2024-09-20 | End: 2024-09-20

## 2024-09-20 RX ORDER — DEXAMETHASONE SODIUM PHOSPHATE 10 MG/ML
10 INJECTION INTRAMUSCULAR; INTRAVENOUS ONCE
Status: COMPLETED | OUTPATIENT
Start: 2024-09-20 | End: 2024-09-20

## 2024-09-20 RX ORDER — AMOXICILLIN AND CLAVULANATE POTASSIUM 500; 125 MG/1; MG/1
1 TABLET, FILM COATED ORAL 2 TIMES DAILY
Qty: 20 TABLET | Refills: 0 | Status: SHIPPED | OUTPATIENT
Start: 2024-09-20 | End: 2024-09-30

## 2024-09-20 RX ORDER — METHYLPREDNISOLONE ACETATE 80 MG/ML
80 INJECTION, SUSPENSION INTRA-ARTICULAR; INTRALESIONAL; INTRAMUSCULAR; SOFT TISSUE ONCE
Status: COMPLETED | OUTPATIENT
Start: 2024-09-20 | End: 2024-09-20

## 2024-09-20 RX ORDER — CODEINE PHOSPHATE/GUAIFENESIN 10-100MG/5
5 LIQUID (ML) ORAL 3 TIMES DAILY PRN
Qty: 180 ML | Refills: 0 | Status: SHIPPED | OUTPATIENT
Start: 2024-09-20

## 2024-09-20 RX ADMIN — METHYLPREDNISOLONE ACETATE 80 MG: 80 INJECTION, SUSPENSION INTRA-ARTICULAR; INTRALESIONAL; INTRAMUSCULAR; SOFT TISSUE at 16:27

## 2024-09-20 RX ADMIN — DEXAMETHASONE SODIUM PHOSPHATE 10 MG: 10 INJECTION INTRAMUSCULAR; INTRAVENOUS at 16:26

## 2024-09-20 RX ADMIN — CEFTRIAXONE SODIUM 1000 MG: 250 INJECTION, POWDER, FOR SOLUTION INTRAMUSCULAR; INTRAVENOUS at 16:25

## 2024-09-20 NOTE — PROGRESS NOTES
"Chief Complaint  Chief Complaint   Patient presents with    URI     Pt c/o not getting better.Pt c/o new symptoms  - nausea, S.O.B       Subjective    History of Present Illness        Ena Lyon presents to [facility] for   URI   This is a new problem. The maximum temperature recorded prior to her arrival was 100.4 - 100.9 F. Associated symptoms include congestion, ear pain, headaches, rhinorrhea, sinus pain and wheezing. Treatments tried: dayquil and mucinex. The treatment provided no relief.      History of Present Illness      Objective   Vital Signs:   Visit Vitals  /80   Pulse 112   Resp 16   Ht 167.6 cm (65.98\")   Wt 93 kg (205 lb)   SpO2 98%   BMI 33.11 kg/m²          BMI is >= 30 and <35. (Class 1 Obesity). The following options were offered after discussion;: exercise counseling/recommendations and nutrition counseling/recommendations     Physical Exam  Vitals reviewed.   Constitutional:       Appearance: She is well-developed.   HENT:      Head: Normocephalic.      Right Ear: External ear normal.      Left Ear: External ear normal.      Nose: Nose normal.   Eyes:      Conjunctiva/sclera: Conjunctivae normal.   Cardiovascular:      Rate and Rhythm: Normal rate and regular rhythm.   Pulmonary:      Effort: Pulmonary effort is normal.      Breath sounds: Normal breath sounds.   Musculoskeletal:         General: Normal range of motion.      Cervical back: Normal range of motion and neck supple.   Skin:     General: Skin is warm and dry.      Capillary Refill: Capillary refill takes less than 2 seconds.   Neurological:      Mental Status: She is alert and oriented to person, place, and time.        Physical Exam           Result Review :  Results                            Assessment and Plan      Diagnoses and all orders for this visit:    1. Shortness of breath (Primary)  Assessment & Plan:  she was prescribed Augmentin and Cheratussin to treat her symptoms.  Patient " was given a Rocephin 1 g IM, Depo-Medrol 80 mg IM and dexamethasone 10 mg IM injection in clinic.  Increase fluids. Tylenol/motrin for pain or fever.   Medication and medication adverse effects discussed.    Follow-up 5-7 days for reevaluation if not improved or sooner if needed.      Orders:  -     POCT ISAI SARS-CoV-2 Antigen MARIA ISABEL  -     guaiFENesin-codeine (GUAIFENESIN AC) 100-10 MG/5ML liquid; Take 5 mL by mouth 3 (Three) Times a Day As Needed for Cough.  Dispense: 180 mL; Refill: 0  -     amoxicillin-clavulanate (Augmentin) 500-125 MG per tablet; Take 1 tablet by mouth 2 (Two) Times a Day for 10 days.  Dispense: 20 tablet; Refill: 0  -     cefTRIAXone (ROCEPHIN) injection 1,000 mg    2. Non-recurrent acute suppurative otitis media of both ears without spontaneous rupture of tympanic membranes  Assessment & Plan:  she was prescribed Augmentin and Cheratussin to treat her symptoms.  Patient was given a Rocephin 1 g IM, Depo-Medrol 80 mg IM and dexamethasone 10 mg IM injection in clinic.  Increase fluids. Tylenol/motrin for pain or fever.   Medication and medication adverse effects discussed.    Follow-up 5-7 days for reevaluation if not improved or sooner if needed.    Orders:  -     amoxicillin-clavulanate (Augmentin) 500-125 MG per tablet; Take 1 tablet by mouth 2 (Two) Times a Day for 10 days.  Dispense: 20 tablet; Refill: 0  -     cefTRIAXone (ROCEPHIN) injection 1,000 mg  -     methylPREDNISolone acetate (DEPO-medrol) injection 80 mg  -     dexAMETHasone (DECADRON) injection 10 mg       Assessment & Plan             Follow Up   No follow-ups on file.  Patient was given instructions and counseling regarding her condition or for health maintenance advice. Please see specific information pulled into the AVS if appropriate.

## 2024-09-23 ENCOUNTER — OFFICE VISIT (OUTPATIENT)
Dept: FAMILY MEDICINE CLINIC | Facility: CLINIC | Age: 34
End: 2024-09-23
Payer: COMMERCIAL

## 2024-09-23 VITALS
BODY MASS INDEX: 33.2 KG/M2 | WEIGHT: 206.6 LBS | RESPIRATION RATE: 14 BRPM | TEMPERATURE: 97.4 F | SYSTOLIC BLOOD PRESSURE: 128 MMHG | DIASTOLIC BLOOD PRESSURE: 80 MMHG | HEIGHT: 66 IN | HEART RATE: 106 BPM | OXYGEN SATURATION: 97 %

## 2024-09-23 DIAGNOSIS — F39 MOOD DISORDER: ICD-10-CM

## 2024-09-23 DIAGNOSIS — R53.83 FATIGUE, UNSPECIFIED TYPE: ICD-10-CM

## 2024-09-23 DIAGNOSIS — Z00.00 ROUTINE GENERAL MEDICAL EXAMINATION AT A HEALTH CARE FACILITY: Primary | ICD-10-CM

## 2024-09-23 LAB
25(OH)D3+25(OH)D2 SERPL-MCNC: 59.9 NG/ML (ref 30–100)
CHOLEST SERPL-MCNC: 141 MG/DL (ref 0–200)
HBA1C MFR BLD: 5.2 % (ref 4.8–5.6)
HDLC SERPL-MCNC: 48 MG/DL (ref 40–60)
IRON SATN MFR SERPL: 9 % (ref 20–50)
IRON SERPL-MCNC: 39 MCG/DL (ref 37–145)
LDLC SERPL CALC-MCNC: 76 MG/DL (ref 0–100)
LDLC/HDLC SERPL: 1.56 {RATIO}
TIBC SERPL-MCNC: 416 MCG/DL
TRIGL SERPL-MCNC: 90 MG/DL (ref 0–150)
TSH SERPL DL<=0.005 MIU/L-ACNC: 2.39 UIU/ML (ref 0.27–4.2)
UIBC SERPL-MCNC: 377 MCG/DL (ref 112–346)
VIT B12 SERPL-MCNC: 688 PG/ML (ref 211–946)
VLDLC SERPL CALC-MCNC: 17 MG/DL (ref 5–40)

## 2024-09-23 PROCEDURE — 99395 PREV VISIT EST AGE 18-39: CPT | Performed by: NURSE PRACTITIONER

## 2024-09-23 RX ORDER — BUSPIRONE HYDROCHLORIDE 10 MG/1
10 TABLET ORAL 3 TIMES DAILY
Qty: 180 TABLET | Refills: 1 | Status: SHIPPED | OUTPATIENT
Start: 2024-09-23

## 2024-09-23 RX ORDER — NORETHINDRONE ACETATE AND ETHINYL ESTRADIOL, ETHINYL ESTRADIOL AND FERROUS FUMARATE 1MG-10(24)
1 KIT ORAL DAILY
Qty: 90 TABLET | Refills: 2 | Status: SHIPPED | OUTPATIENT
Start: 2024-09-23

## 2024-09-23 RX ORDER — ERGOCALCIFEROL 1.25 MG/1
CAPSULE, LIQUID FILLED ORAL
COMMUNITY
Start: 2024-07-15

## 2024-09-30 PROBLEM — H66.003 NON-RECURRENT ACUTE SUPPURATIVE OTITIS MEDIA OF BOTH EARS WITHOUT SPONTANEOUS RUPTURE OF TYMPANIC MEMBRANES: Status: ACTIVE | Noted: 2024-09-30

## 2024-09-30 PROBLEM — R06.02 SHORTNESS OF BREATH: Status: ACTIVE | Noted: 2024-09-30

## 2024-10-01 NOTE — ASSESSMENT & PLAN NOTE
she was prescribed Augmentin and Cheratussin to treat her symptoms.  Patient was given a Rocephin 1 g IM, Depo-Medrol 80 mg IM and dexamethasone 10 mg IM injection in clinic.  Increase fluids. Tylenol/motrin for pain or fever.   Medication and medication adverse effects discussed.    Follow-up 5-7 days for reevaluation if not improved or sooner if needed.

## 2024-10-02 ENCOUNTER — TELEPHONE (OUTPATIENT)
Dept: FAMILY MEDICINE CLINIC | Facility: CLINIC | Age: 34
End: 2024-10-02

## 2024-10-02 NOTE — TELEPHONE ENCOUNTER
Called spoke to pt in detail regarding ear infections. Pt states she has been taking sudafed since Friday has finished both antibiotics and there is still no change. Pt is wanting to know what would be the next steps or is there something else she can do

## 2024-10-02 NOTE — TELEPHONE ENCOUNTER
Caller: Ena Lyon    Relationship: Self    Best call back number: [phone]     Who are you requesting to speak with (clinical staff, provider,  specific staff member): CLINICAL STAFF    What was the call regarding: PATIENT STATES THAT SHE HAD 2 RECENT APPOINTMENTS FOR ONGOING EAR INFECTIONS. SHE HAS FINISHED THE ANTIBIOTICS SHE WAS PRESCRIBED, BUT HAS NOT NOTICED ANY CHANGE, AND HER EARS ARE STILL CLOGGED. REQUESTS CALL BACK TO DISCUSS FURTHER TREATMENT SUGGESTIONS

## 2024-10-03 NOTE — TELEPHONE ENCOUNTER
PATIENT IS CALLING TO CHECK ON STATUS OF THIS. PLEASE ADVISE ASAP.    IF ENT IS SUGGESTED, PLEASE GIVE PATIENT SEVERAL OPTIONS SO THAT SHE CAN FIND ONE IN NETWORK WITH HER INSURANCE.

## 2024-10-03 NOTE — TELEPHONE ENCOUNTER
Called spoke with pt letting pt know that denise is out of the office but a message was sent and as soon as I hear back I will give pt a call. Pt voiced understanding

## 2024-10-08 ENCOUNTER — OFFICE VISIT (OUTPATIENT)
Dept: FAMILY MEDICINE CLINIC | Facility: CLINIC | Age: 34
End: 2024-10-08
Payer: COMMERCIAL

## 2024-10-08 VITALS
OXYGEN SATURATION: 97 % | DIASTOLIC BLOOD PRESSURE: 82 MMHG | HEART RATE: 102 BPM | HEIGHT: 66 IN | SYSTOLIC BLOOD PRESSURE: 138 MMHG | RESPIRATION RATE: 14 BRPM | WEIGHT: 209.1 LBS | BODY MASS INDEX: 33.61 KG/M2 | TEMPERATURE: 97 F

## 2024-10-08 DIAGNOSIS — Z23 NEED FOR VACCINATION: ICD-10-CM

## 2024-10-08 DIAGNOSIS — R79.0 ABNORMAL IRON SATURATION: ICD-10-CM

## 2024-10-08 DIAGNOSIS — H69.93 EUSTACHIAN TUBE DYSFUNCTION, BILATERAL: Primary | ICD-10-CM

## 2024-10-08 PROCEDURE — 90471 IMMUNIZATION ADMIN: CPT | Performed by: NURSE PRACTITIONER

## 2024-10-08 PROCEDURE — 99213 OFFICE O/P EST LOW 20 MIN: CPT | Performed by: NURSE PRACTITIONER

## 2024-10-08 PROCEDURE — 90656 IIV3 VACC NO PRSV 0.5 ML IM: CPT | Performed by: NURSE PRACTITIONER

## 2024-10-08 RX ORDER — PSEUDOEPHEDRINE HCL 30 MG
30 TABLET ORAL EVERY 6 HOURS PRN
Qty: 60 TABLET | Refills: 0 | Status: SHIPPED | OUTPATIENT
Start: 2024-10-08

## 2024-10-08 NOTE — PROGRESS NOTES
"Chief Complaint  Ear Problem    Subjective        Ena Lyon presents to [facility]  History of Present Illness    History of Present Illness  The patient is a 34-year-old female who presents for evaluation of an ear infection.    She experienced an ear infection approximately 3 weeks ago, which has resulted in persistent blockage in both ears. She completed a course of antibiotics last Tuesday and has been taking Sudafed for the past 5 to 6 days. Despite these treatments, she reports a sensation of being underwater and some discomfort, although the pain is minimal. She also mentions occasional voice loss and coughing episodes at night that disrupt her sleep. She reports no wheezing or shortness of breath. She has not been using Flonase, even though she has it at home, due to advice against nasal spray use. She has a history of nosebleeds during illness and has previously received a steroid injection with anti-nausea medication, which led to retching and a subsequent nosebleed.    She is considering whether to take an iron supplement or a multivitamin, as she does not typically experience constipation. She currently has a multivitamin containing 1.4 mg of iron and is also taking vitamin C, D, and zinc supplements.  No fever, chills, pharyngitis, HA.      Objective   Vital Signs:  /82   Pulse 102   Temp 97 °F (36.1 °C) (Infrared)   Resp 14   Ht 167.6 cm (65.98\")   Wt 94.8 kg (209 lb 1.6 oz)   SpO2 97%   BMI 33.77 kg/m²   Estimated body mass index is 33.77 kg/m² as calculated from the following:    Height as of this encounter: 167.6 cm (65.98\").    Weight as of this encounter: 94.8 kg (209 lb 1.6 oz).               Physical Exam  Vitals and nursing note reviewed.   Constitutional:       General: She is not in acute distress.     Appearance: She is well-developed. She is not ill-appearing or diaphoretic.   HENT:      Head: Normocephalic and atraumatic.      Right Ear: Ear " canal and external ear normal.      Left Ear: Ear canal and external ear normal.      Ears:      Comments: Absent LR b/l, no erythema     Mouth/Throat:      Mouth: Mucous membranes are moist.      Pharynx: Posterior oropharyngeal erythema present. No oropharyngeal exudate.   Eyes:      General:         Right eye: No discharge.         Left eye: No discharge.      Conjunctiva/sclera: Conjunctivae normal.   Cardiovascular:      Rate and Rhythm: Normal rate and regular rhythm.   Pulmonary:      Effort: Pulmonary effort is normal.      Breath sounds: Normal breath sounds.   Abdominal:      General: Bowel sounds are normal.      Palpations: Abdomen is soft.   Musculoskeletal:         General: No deformity.      Cervical back: Neck supple.      Comments: Gait smooth and steady   Lymphadenopathy:      Cervical: No cervical adenopathy.   Skin:     General: Skin is warm and dry.   Neurological:      Mental Status: She is alert and oriented to person, place, and time.   Psychiatric:         Behavior: Behavior normal.          Physical Exam       Result Review :            Results                  Assessment and Plan     Diagnoses and all orders for this visit:    1. Eustachian tube dysfunction, bilateral (Primary)  -     pseudoephedrine (Sudafed) 30 MG tablet; Take 1 tablet by mouth Every 6 (Six) Hours As Needed (ear congestion).  Dispense: 60 tablet; Refill: 0    2. Abnormal iron saturation    3. Need for vaccination  -     Fluzone >6mos        Assessment & Plan  Eustachian tube dysfunction  The patient reports blocked ears and discomfort following an ear infection treated with antibiotics and Sudafed. Examination revealed fluid behind the tympanic membrane. Flonase was recommended for use twice daily for 3 days, then once daily. A prescription for Sudafed will be sent to [facility]. She was advised to avoid Afrin due to its addictive properties and potential to cause rebound congestion. If symptoms do not improve, a referral to  an ENT specialist will be considered. She was also advised to sleep on an incline to reduce coughing at night.    2. Cough.  The patient reports waking up coughing at night, which may be related to postnasal drip or acid reflux. She was advised to sleep on an incline to help reduce symptoms. If the cough persists, further evaluation may be necessary.    3. Low TSAT  The patient inquired about iron supplementation due to low iron levels. An iron supplement was recommended for use three times a week, along with a multivitamin on alternate days. She was informed about the potential side effect of constipation. If constipation becomes severe, the frequency of iron supplement intake may need to be reduced.              Follow Up     No follow-ups on file.  Patient was given instructions and counseling regarding her condition or for health maintenance advice. Please see specific information pulled into the AVS if appropriate.    Patient or patient representative verbalized consent for the use of Ambient Listening during the visit with  SARAH Sparrow for chart documentation. 10/20/2024  15:28 EDT       Yes

## 2024-10-22 RX ORDER — PREDNISONE 20 MG/1
40 TABLET ORAL DAILY
Qty: 10 TABLET | Refills: 0 | Status: SHIPPED | OUTPATIENT
Start: 2024-10-22 | End: 2024-10-27

## 2025-01-31 ENCOUNTER — TELEPHONE (OUTPATIENT)
Dept: FAMILY MEDICINE CLINIC | Facility: CLINIC | Age: 35
End: 2025-01-31

## 2025-01-31 ENCOUNTER — OFFICE VISIT (OUTPATIENT)
Dept: FAMILY MEDICINE CLINIC | Facility: CLINIC | Age: 35
End: 2025-01-31
Payer: COMMERCIAL

## 2025-01-31 ENCOUNTER — NURSE TRIAGE (OUTPATIENT)
Dept: CALL CENTER | Facility: HOSPITAL | Age: 35
End: 2025-01-31
Payer: COMMERCIAL

## 2025-01-31 VITALS
WEIGHT: 220 LBS | HEART RATE: 118 BPM | BODY MASS INDEX: 35.36 KG/M2 | HEIGHT: 66 IN | OXYGEN SATURATION: 97 % | DIASTOLIC BLOOD PRESSURE: 78 MMHG | TEMPERATURE: 97.5 F | SYSTOLIC BLOOD PRESSURE: 142 MMHG

## 2025-01-31 DIAGNOSIS — G44.83 COUGH HEADACHE: ICD-10-CM

## 2025-01-31 DIAGNOSIS — J10.1 INFLUENZA A: Primary | ICD-10-CM

## 2025-01-31 DIAGNOSIS — J02.9 SORE THROAT: ICD-10-CM

## 2025-01-31 LAB
EXPIRATION DATE: ABNORMAL
EXPIRATION DATE: NORMAL
EXPIRATION DATE: NORMAL
FLUAV AG NPH QL: POSITIVE
FLUBV AG NPH QL: NEGATIVE
INTERNAL CONTROL: ABNORMAL
INTERNAL CONTROL: NORMAL
INTERNAL CONTROL: NORMAL
Lab: ABNORMAL
Lab: NORMAL
Lab: NORMAL
S PYO AG THROAT QL: NEGATIVE
SARS-COV-2 AG UPPER RESP QL IA.RAPID: NOT DETECTED

## 2025-01-31 RX ORDER — OSELTAMIVIR PHOSPHATE 75 MG/1
75 CAPSULE ORAL 2 TIMES DAILY
Qty: 10 CAPSULE | Refills: 0 | Status: SHIPPED | OUTPATIENT
Start: 2025-01-31 | End: 2025-02-14

## 2025-01-31 RX ORDER — BENZONATATE 100 MG/1
100 CAPSULE ORAL 3 TIMES DAILY PRN
Qty: 21 CAPSULE | Refills: 0 | Status: SHIPPED | OUTPATIENT
Start: 2025-01-31 | End: 2025-02-14

## 2025-01-31 RX ORDER — AZELASTINE 1 MG/ML
2 SPRAY, METERED NASAL 2 TIMES DAILY
Qty: 30 ML | Refills: 3 | Status: SHIPPED | OUTPATIENT
Start: 2025-01-31

## 2025-01-31 NOTE — TELEPHONE ENCOUNTER
"C/o cough, sore throat, shortness of breath, sneezing, runny nose, headache, fatigue, body aches, possible subjective fever, loss of appetite. C/o occasional chest pressure with cough. Reviewed protocol with patient. Patient would like to make appointment with PCP. Connected patient with Mariam at SARAH Mi's office.    Reason for Disposition   Patient sounds very sick or weak to the triager    Additional Information   Negative: SEVERE difficulty breathing (e.g., struggling for each breath, speaks in single words)   Negative: [1] Breathing stopped AND [2] hasn't returned   Negative: Choking on something   Negative: Bluish (or gray) lips or face now   Negative: Difficult to awaken or acting confused (e.g., disoriented, slurred speech)   Negative: Passed out (i.e., lost consciousness, collapsed and was not responding)   Negative: Wheezing started suddenly after medicine, an allergic food or bee sting   Negative: Stridor (harsh sound while breathing in)   Negative: Slow, shallow and weak breathing   Negative: Sounds like a life-threatening emergency to the triager   Negative: Chest pain   Negative: [1] Wheezing (high pitched whistling sound) AND [2] previous asthma attacks or use of asthma medicines   Negative: [1] Difficulty breathing AND [2] only present when coughing   Negative: [1] Difficulty breathing AND [2] only from stuffy or runny nose   Negative: [1] Difficulty breathing AND [2] within 14 days of COVID-19 Exposure   Negative: [1] MODERATE difficulty breathing (e.g., speaks in phrases, SOB even at rest, pulse 100-120) AND [2] NEW-onset or WORSE than normal   Negative: Oxygen level (e.g., pulse oximetry) 90 percent or lower   Negative: Wheezing can be heard across the room   Negative: Drooling or spitting out saliva (because can't swallow)   Negative: History of prior \"blood clot\" in leg or lungs (i.e., deep vein thrombosis, pulmonary embolism)   Negative: History of inherited increased risk of " "blood clots (e.g., Factor 5 Leiden, Anti-thrombin 3, Protein C or Protein S deficiency, Prothrombin mutation)   Negative: Major surgery in the past month   Negative: Hip or leg fracture (broken bone) in past month (or had cast on leg or ankle in past month)   Negative: Illness requiring prolonged bedrest in past month (e.g., immobilization, long hospital stay)   Negative: Long-distance travel in past month (e.g., car, bus, train, plane; with trip lasting 6 or more hours)   Negative: Cancer treatment in past six months (or has cancer now)   Negative: Extra heartbeats, irregular heart beating, or heart is beating very fast  (i.e., \"palpitations\")   Negative: Fever > 103 F (39.4 C)   Negative: [1] Fever > 101 F (38.3 C) AND [2] age > 60 years   Negative: [1] Fever > 100.0 F (37.8 C) AND [2] bedridden (e.g., CVA, chronic illness, recovering from surgery)   Negative: [1] Fever > 100.0 F (37.8 C) AND [2] diabetes mellitus or weak immune system (e.g., HIV positive, cancer chemo, splenectomy, organ transplant, chronic steroids)   Negative: [1] Periods where breathing stops and then resumes normally AND [2] bedridden (e.g., CVA)   Negative: Pregnant or postpartum (from 0 to 6 weeks after delivery)    Answer Assessment - Initial Assessment Questions  1. RESPIRATORY STATUS: \"Describe your breathing?\" (e.g., wheezing, shortness of breath, unable to speak, severe coughing)       Shortness of breath, cough, wheezing  2. ONSET: \"When did this breathing problem begin?\"       3 days ago  3. PATTERN \"Does the difficult breathing come and go, or has it been constant since it started?\"       Comes and goes  4. SEVERITY: \"How bad is your breathing?\" (e.g., mild, moderate, severe)     - MILD: No SOB at rest, mild SOB with walking, speaks normally in sentences, can lie down, no retractions, pulse < 100.     - MODERATE: SOB at rest, SOB with minimal exertion and prefers to sit, cannot lie down flat, speaks in phrases, mild retractions, " "audible wheezing, pulse 100-120.     - SEVERE: Very SOB at rest, speaks in single words, struggling to breathe, sitting hunched forward, retractions, pulse > 120       Moderate   5. RECURRENT SYMPTOM: \"Have you had difficulty breathing before?\" If Yes, ask: \"When was the last time?\" and \"What happened that time?\"       No   6. CARDIAC HISTORY: \"Do you have any history of heart disease?\" (e.g., heart attack, angina, bypass surgery, angioplasty)       No   7. LUNG HISTORY: \"Do you have any history of lung disease?\"  (e.g., pulmonary embolus, asthma, emphysema)      No   8. CAUSE: \"What do you think is causing the breathing problem?\"       Illness   9. OTHER SYMPTOMS: \"Do you have any other symptoms? (e.g., dizziness, runny nose, cough, chest pain, fever)      See documentation  10. O2 SATURATION MONITOR:  \"Do you use an oxygen saturation monitor (pulse oximeter) at home?\" If Yes, ask: \"What is your reading (oxygen level) today?\" \"What is your usual oxygen saturation reading?\" (e.g., 95%)        Not available  11. PREGNANCY: \"Is there any chance you are pregnant?\" \"When was your last menstrual period?\"        N/A  12. TRAVEL: \"Have you traveled out of the country in the last month?\" (e.g., travel history, exposures)        No    Protocols used: Breathing Difficulty-ADULT-AH    "

## 2025-01-31 NOTE — TELEPHONE ENCOUNTER
DELETE AFTER REVIEWING: Send this encounter to the appropriate pool. See your Call Action Grid or Workflows for direction.    Caller: Ena Lyon    Relationship to patient: Self    Best call back number: [phone]     Chief complaint: CHEST PRESSURE    Patient directed to call 911 or go to their nearest emergency room.     Patient verbalized understanding: [] Yes  [x] No  If no, why?    Additional notes:PATIENT SAID SHE DID NOT THINK IT WAS THAT SERIOUS.  I TRANSFERRED TO [facility]

## 2025-02-12 ENCOUNTER — TELEPHONE (OUTPATIENT)
Dept: FAMILY MEDICINE CLINIC | Facility: CLINIC | Age: 35
End: 2025-02-12

## 2025-02-12 NOTE — TELEPHONE ENCOUNTER
Caller: Ena Lyon    Relationship: Self    Best call back number: [phone]     What orders are you requesting (i.e. lab or imaging): FULL AUTO-IMMUNE DISEASE PANEL     In what timeframe would the patient need to come in: ASAP    Where will you receive your lab/imaging services: OFFICE    Additional notes: ENT RECOMMENDED SHE GET THIS LAB DONE FROM HER HEARING LOSS SHE HAD PLEASE CALL AND ADVISED

## 2025-02-13 ENCOUNTER — TELEPHONE (OUTPATIENT)
Dept: FAMILY MEDICINE CLINIC | Facility: CLINIC | Age: 35
End: 2025-02-13
Payer: COMMERCIAL

## 2025-02-14 ENCOUNTER — OFFICE VISIT (OUTPATIENT)
Dept: FAMILY MEDICINE CLINIC | Facility: CLINIC | Age: 35
End: 2025-02-14
Payer: COMMERCIAL

## 2025-02-14 VITALS
OXYGEN SATURATION: 99 % | BODY MASS INDEX: 35.36 KG/M2 | RESPIRATION RATE: 14 BRPM | HEART RATE: 99 BPM | SYSTOLIC BLOOD PRESSURE: 128 MMHG | TEMPERATURE: 97 F | DIASTOLIC BLOOD PRESSURE: 87 MMHG | WEIGHT: 220 LBS | HEIGHT: 66 IN

## 2025-02-14 DIAGNOSIS — Z86.19 FREQUENT INFECTIONS: ICD-10-CM

## 2025-02-14 DIAGNOSIS — R53.82 CHRONIC FATIGUE: ICD-10-CM

## 2025-02-14 DIAGNOSIS — F39 MOOD DISORDER: ICD-10-CM

## 2025-02-14 DIAGNOSIS — H90.3 SENSORINEURAL HEARING LOSS (SNHL) OF BOTH EARS: Primary | ICD-10-CM

## 2025-02-14 PROCEDURE — 99214 OFFICE O/P EST MOD 30 MIN: CPT | Performed by: NURSE PRACTITIONER

## 2025-02-14 NOTE — PROGRESS NOTES
Chief Complaint  Labs Only (Pt is wanting a auto immune panel completed)    Subjective        Ena Lyon presents to [facility]  History of Present Illness    History of Present Illness  The patient presents at behest of ENT for evaluation of hearing loss, fatigue, sinus issues, and frequent illness.  He feels she needs an autoimmune w/u.    She experienced an ear infection in 09/2024, which has resulted in persistent blockage of both ears, with the right ear being more affected. A recent follow-up on Wednesday revealed that the issue is not related to the eustachian tube but rather a manifestation of hearing loss. She was informed that the ear infection was the tipping point for an ongoing condition. She has no family history of autoimmune disorders or early-onset hearing loss, although her parents have age-related hearing loss in their 70s. She plans to undergo a hearing test at [facility] before considering hearing aids.    She reports frequent illnesses, including a recent bout of influenza two weeks ago, despite having received the influenza vaccine. She believes the vaccine mitigated the severity of her symptoms. She also had flu-like illness in 09/2024. She has not undergone comprehensive autoimmune testing. She reports no rashes or abdominal symptoms but mentions a sensitive stomach prone to diarrhea, which has improved with probiotic use. She occasionally experiences dizziness, particularly when moving quickly at work, but not when stationary. She reports no muscle aches, pains, joint pains, dry mouth, or dry eyes. She frequently feels mucus in her throat but has not been tested for allergies and does not take daily antihistamines. She has not been tested for sleep apnea and does not snore as far as she knows. She often feels fatigued upon waking and desires to lie down even when not needing a nap. She had a urinary tract infection (UTI) in 08/2024, which was treated with  "antibiotics. Does not tend to get them frequently.  She maintains good hydration and notes an increase in illness frequency compared to 10 years ago. 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 She attempts to walk after lunch at work, weather permitting. Feels like this is helpful.     She has been taking iron supplements, which have slightly alleviated her fatigue, but she continues to experience tiredness. She has been experiencing headaches that disrupt her sleep, a symptom that began last week during her recovery from the flu. She has not had any headaches since then and had not experienced any for a long time prior to the flu.    Supplemental Information  She has a history of acid reflux, for which she took Prevacid as a child, but currently does not have acid reflux. She had her thyroid checked every few years during middle and high school, with no abnormalities detected.    FAMILY HISTORY  She reports no family history of autoimmune conditions or hearing loss at a young age. Her parents have hearing loss, but they are in their 70s.    MEDICATIONS  Current    IMMUNIZATIONS  She has received the influenza vaccine.       Objective   Vital Signs:  /87   Pulse 99   Temp 97 °F (36.1 °C) (Infrared)   Resp 14   Ht 167.6 cm (65.98\")   Wt 99.8 kg (220 lb)   SpO2 99%   BMI 35.53 kg/m²   Estimated body mass index is 35.53 kg/m² as calculated from the following:    Height as of this encounter: 167.6 cm (65.98\").    Weight as of this encounter: 99.8 kg (220 lb).               Physical Exam  Vitals and nursing note reviewed.   Constitutional:       General: She is not in acute distress.     Appearance: She is well-developed. She is not ill-appearing or diaphoretic.   HENT:      Head: Normocephalic and atraumatic.      Mouth/Throat:      Mouth: Mucous membranes are moist.      Pharynx: No posterior oropharyngeal erythema.      Comments: Narrow " oropharynx  Eyes:      General:         Right eye: No discharge.         Left eye: No discharge.      Conjunctiva/sclera: Conjunctivae normal.   Cardiovascular:      Rate and Rhythm: Normal rate and regular rhythm.      Heart sounds: Normal heart sounds.   Pulmonary:      Effort: Pulmonary effort is normal.      Breath sounds: Normal breath sounds.   Abdominal:      General: Bowel sounds are normal.      Palpations: Abdomen is soft.   Musculoskeletal:         General: No deformity.      Cervical back: Neck supple.      Comments: Gait smooth and steady   Lymphadenopathy:      Cervical: No cervical adenopathy.   Skin:     General: Skin is warm and dry.   Neurological:      Mental Status: She is alert and oriented to person, place, and time.   Psychiatric:         Mood and Affect: Mood normal.         Behavior: Behavior normal.          Physical Exam       Result Review :            Results  Testing  Bilateral mild fairly flat symmetric sensorineural hearing loss identified in hearing test.                Assessment and Plan     Diagnoses and all orders for this visit:    1. Sensorineural hearing loss (SNHL) of both ears (Primary)  -     KEON Direct Reflex to 11 Biomarker    2. Chronic fatigue  -     CBC & Differential  -     Comprehensive Metabolic Panel  -     Iron Profile  -     TSH Rfx On Abnormal To Free T4  -     Thyroid Peroxidase Antibody  -     Ferritin    3. Frequent infections  -     IgG 4    4. Mood disorder    Other orders  -     IgG  -     Written Authorization        Assessment & Plan  1. Bilateral mild sensorineural hearing loss.  A hearing test today showed bilateral mild, fairly flat, symmetric sensorineural hearing loss. Hearing aids were recommended. She plans to get another hearing test at [facility] before deciding on hearing aids. Reviewed ENT note and eval.     2. Fatigue.  She reports persistent fatigue despite taking iron supplements. Iron levels and ferritin will be checked to assess for iron  deficiency.    3. Frequent infections.  She has experienced multiple infections, including a recent flu episode two weeks ago, despite receiving the flu vaccine. Her immune system will be evaluated to determine if there is an underlying issue contributing to her frequent illnesses.    4. Sinus issues.  She reports constant mucus in her throat and occasional dizziness. She will resume using Flonase. She has not been allergy tested and does not take daily antihistamines.    5. Headaches.  She experienced headaches that woke her up at night while recovering from the flu last week. No recent headaches have occurred since then.            Follow Up     No follow-ups on file.  Patient was given instructions and counseling regarding her condition or for health maintenance advice. Please see specific information pulled into the AVS if appropriate.    Patient or patient representative verbalized consent for the use of Ambient Listening during the visit with  SARAH Sparrow for chart documentation. 3/4/2025  07:45 EST

## 2025-02-16 NOTE — PROGRESS NOTES
"Chief Complaint  Cough, Sore Throat, Headache, and Fatigue (All started Wednesday morning.)    Subjective        Ena Lyon presents to [facility]  Cough  Associated symptoms include headaches and a sore throat.   Sore Throat   Associated symptoms include coughing and headaches.   Headache  Fatigue  Symptoms include cough, fatigue, headaches and sore throat.        History of Present Illness  The patient presents with complaints of cough sore throat headache and fatigue    She began experiencing symptoms on Wednesday morning, which have progressively worsened over the past 2 days. Her symptoms include coughing, sore throat, sneezing, runny nose, headache, and body aches. She is not taking any over-the-counter medications. She received her influenza vaccine this year.  They did reach she is not coughing all the time, but when she does, it is really painful. She took some cold and flu daytime cough syrup yesterday.    Supplemental Information  She had an ear infection back in September and her ears have not been right since then.    IMMUNIZATIONS  She received the influenza vaccine this year.     Objective   Vital Signs:  /78 (BP Location: Left arm, Patient Position: Sitting, Cuff Size: Adult)   Pulse 118   Temp 97.5 °F (36.4 °C) (Temporal)   Ht 167.6 cm (65.98\")   Wt 99.8 kg (220 lb)   SpO2 97%   BMI 35.53 kg/m²   Estimated body mass index is 35.53 kg/m² as calculated from the following:    Height as of this encounter: 167.6 cm (65.98\").    Weight as of this encounter: 99.8 kg (220 lb).               Physical Exam  Vitals and nursing note reviewed.   Constitutional:       General: She is not in acute distress.     Appearance: She is well-developed.   HENT:      Head: Normocephalic and atraumatic.      Right Ear: External ear normal. No middle ear effusion.      Left Ear: External ear normal.  No middle ear effusion.      Nose:      Right Turbinates: Swollen.      Left " Turbinates: Swollen.      Right Sinus: No maxillary sinus tenderness or frontal sinus tenderness.      Left Sinus: No maxillary sinus tenderness or frontal sinus tenderness.      Mouth/Throat:      Lips: Pink.      Mouth: Mucous membranes are moist.   Eyes:      General:         Right eye: No discharge.         Left eye: No discharge.      Pupils: Pupils are equal, round, and reactive to light.   Cardiovascular:      Rate and Rhythm: Normal rate and regular rhythm.      Heart sounds: Normal heart sounds.   Pulmonary:      Effort: Pulmonary effort is normal.      Breath sounds: Normal breath sounds. No wheezing or rales.   Abdominal:      General: Bowel sounds are normal.      Palpations: Abdomen is soft. There is no mass.      Tenderness: There is no abdominal tenderness.   Musculoskeletal:      Cervical back: Normal range of motion and neck supple.   Lymphadenopathy:      Cervical: No cervical adenopathy.   Neurological:      Mental Status: She is alert and oriented to person, place, and time.        Result Review :                   Assessment and Plan   Diagnoses and all orders for this visit:    1. Influenza A (Primary)  -     Discontinue: oseltamivir (Tamiflu) 75 MG capsule; Take 1 capsule by mouth 2 (Two) Times a Day.  Dispense: 10 capsule; Refill: 0  -     azelastine (ASTELIN) 0.1 % nasal spray; Administer 2 sprays into the nostril(s) as directed by provider 2 (Two) Times a Day. (Patient not taking: Reported on 2/14/2025)  Dispense: 30 mL; Refill: 3  -     Discontinue: benzonatate (Tessalon Perles) 100 MG capsule; Take 1 capsule by mouth 3 (Three) Times a Day As Needed for Cough.  Dispense: 21 capsule; Refill: 0    2. Cough headache  -     POCT SARS-CoV-2 Antigen MARIA ISABEL  -     POCT Influenza A/B  -     POCT rapid strep A    3. Sore throat  -     POCT SARS-CoV-2 Antigen MARIA ISABEL  -     POCT Influenza A/B  -     POCT rapid strep A        Assessment & Plan  1. Influenza A.  She reports symptoms starting on Wednesday,  including coughing, sore throat, sneezing, runny nose, headache, and body ache. Influenza A test returned positive. Tamiflu will be prescribed to reduce the duration of symptoms. For fever and headache, she is advised to take Tylenol or ibuprofen as needed. A nasal spray will be provided to manage excessive drainage. Tessalon Perles will be prescribed for cough relief. She can also use NyQuil during the day and at night.              Follow Up   There are no Patient Instructions on file for this visit.   No follow-ups on file.  Patient was given instructions and counseling regarding her condition or for health maintenance advice. Please see specific information pulled into the AVS if appropriate.     Patient or patient representative verbalized consent for the use of Ambient Listening during the visit with  Lizeth Millan MD for chart documentation. 2/16/2025  16:42 EST

## 2025-02-17 LAB
ALBUMIN SERPL-MCNC: 4.5 G/DL (ref 3.9–4.9)
ALP SERPL-CCNC: 87 IU/L (ref 44–121)
ALT SERPL-CCNC: 29 IU/L (ref 0–32)
ANA SER QL: NEGATIVE
AST SERPL-CCNC: 17 IU/L (ref 0–40)
BASOPHILS # BLD AUTO: 0.1 X10E3/UL (ref 0–0.2)
BASOPHILS NFR BLD AUTO: 1 %
BILIRUB SERPL-MCNC: 0.5 MG/DL (ref 0–1.2)
BUN SERPL-MCNC: 9 MG/DL (ref 6–20)
BUN/CREAT SERPL: 10 (ref 9–23)
CALCIUM SERPL-MCNC: 9.4 MG/DL (ref 8.7–10.2)
CHLORIDE SERPL-SCNC: 105 MMOL/L (ref 96–106)
CO2 SERPL-SCNC: 23 MMOL/L (ref 20–29)
CREAT SERPL-MCNC: 0.94 MG/DL (ref 0.57–1)
EGFRCR SERPLBLD CKD-EPI 2021: 82 ML/MIN/1.73
EOSINOPHIL # BLD AUTO: 0.1 X10E3/UL (ref 0–0.4)
EOSINOPHIL NFR BLD AUTO: 2 %
ERYTHROCYTE [DISTWIDTH] IN BLOOD BY AUTOMATED COUNT: 12.8 % (ref 11.7–15.4)
FERRITIN SERPL-MCNC: 52 NG/ML (ref 15–150)
GLOBULIN SER CALC-MCNC: 2.4 G/DL (ref 1.5–4.5)
GLUCOSE SERPL-MCNC: 89 MG/DL (ref 70–99)
HCT VFR BLD AUTO: 41.6 % (ref 34–46.6)
HGB BLD-MCNC: 13.3 G/DL (ref 11.1–15.9)
IGG4 SER-MCNC: 34 MG/DL (ref 2–96)
IMM GRANULOCYTES # BLD AUTO: 0 X10E3/UL (ref 0–0.1)
IMM GRANULOCYTES NFR BLD AUTO: 0 %
IRON SATN MFR SERPL: 28 % (ref 15–55)
IRON SERPL-MCNC: 110 UG/DL (ref 27–159)
LYMPHOCYTES # BLD AUTO: 1.7 X10E3/UL (ref 0.7–3.1)
LYMPHOCYTES NFR BLD AUTO: 29 %
MCH RBC QN AUTO: 29.3 PG (ref 26.6–33)
MCHC RBC AUTO-ENTMCNC: 32 G/DL (ref 31.5–35.7)
MCV RBC AUTO: 92 FL (ref 79–97)
MONOCYTES # BLD AUTO: 0.5 X10E3/UL (ref 0.1–0.9)
MONOCYTES NFR BLD AUTO: 9 %
NEUTROPHILS # BLD AUTO: 3.4 X10E3/UL (ref 1.4–7)
NEUTROPHILS NFR BLD AUTO: 59 %
PLATELET # BLD AUTO: 284 X10E3/UL (ref 150–450)
POTASSIUM SERPL-SCNC: 4.2 MMOL/L (ref 3.5–5.2)
PROT SERPL-MCNC: 6.9 G/DL (ref 6–8.5)
RBC # BLD AUTO: 4.54 X10E6/UL (ref 3.77–5.28)
SODIUM SERPL-SCNC: 142 MMOL/L (ref 134–144)
THYROPEROXIDASE AB SERPL-ACNC: 11 IU/ML (ref 0–34)
TIBC SERPL-MCNC: 387 UG/DL (ref 250–450)
TSH SERPL DL<=0.005 MIU/L-ACNC: 2.39 UIU/ML (ref 0.45–4.5)
UIBC SERPL-MCNC: 277 UG/DL (ref 131–425)
WBC # BLD AUTO: 5.8 X10E3/UL (ref 3.4–10.8)

## 2025-02-18 DIAGNOSIS — H90.3 SENSORINEURAL HEARING LOSS (SNHL) OF BOTH EARS: Primary | ICD-10-CM

## 2025-02-19 LAB
IGG SERPL-MCNC: 1088 MG/DL (ref 586–1602)
WRITTEN AUTHORIZATION: NORMAL

## 2025-02-20 ENCOUNTER — TELEPHONE (OUTPATIENT)
Dept: FAMILY MEDICINE CLINIC | Facility: CLINIC | Age: 35
End: 2025-02-20
Payer: COMMERCIAL

## 2025-04-09 DIAGNOSIS — F39 MOOD DISORDER: ICD-10-CM

## 2025-04-09 NOTE — TELEPHONE ENCOUNTER
Rx Refill Note  Requested Prescriptions     Pending Prescriptions Disp Refills    FLUoxetine (PROzac) 40 MG capsule [Pharmacy Med Name: FLUoxetine HCL 40 MG CAPSULE] 30 capsule      Sig: TAKE 1 CAPSULE BY MOUTH DAILY      Last office visit with prescribing clinician: 1/31/2025   Last telemedicine visit with prescribing clinician: Visit date not found   Next office visit with prescribing clinician: Visit date not found                         Would you like a call back once the refill request has been completed: [] Yes [] No    If the office needs to give you a call back, can they leave a voicemail: [] Yes [] No    Cathy Granger MA  04/09/25, 08:35 EDT

## 2025-04-10 RX ORDER — FLUOXETINE HYDROCHLORIDE 40 MG/1
40 CAPSULE ORAL DAILY
Qty: 90 CAPSULE | Refills: 1 | Status: SHIPPED | OUTPATIENT
Start: 2025-04-10

## 2025-05-15 RX ORDER — RIZATRIPTAN BENZOATE 10 MG/1
10 TABLET ORAL ONCE AS NEEDED
Qty: 9 TABLET | Refills: 6 | Status: SHIPPED | OUTPATIENT
Start: 2025-05-15

## 2025-05-15 NOTE — TELEPHONE ENCOUNTER
Rx Refill Note  Requested Prescriptions     Pending Prescriptions Disp Refills    rizatriptan (MAXALT) 10 MG tablet 12 tablet 1     Sig: May repeat in 2 hours if needed      Last office visit with prescribing clinician: 1/31/2025   Last telemedicine visit with prescribing clinician: Visit date not found   Next office visit with prescribing clinician: Visit date not found                         Would you like a call back once the refill request has been completed: [] Yes [] No    If the office needs to give you a call back, can they leave a voicemail: [] Yes [] No    Grisel Main MA  05/15/25, 08:11 EDT

## 2025-05-27 DIAGNOSIS — F39 MOOD DISORDER: ICD-10-CM

## 2025-05-27 RX ORDER — BUSPIRONE HYDROCHLORIDE 10 MG/1
10 TABLET ORAL 3 TIMES DAILY
Qty: 180 TABLET | Refills: 1 | Status: SHIPPED | OUTPATIENT
Start: 2025-05-27

## 2025-05-27 NOTE — TELEPHONE ENCOUNTER
Rx Refill Note  Requested Prescriptions     Pending Prescriptions Disp Refills    busPIRone (BUSPAR) 10 MG tablet [Pharmacy Med Name: busPIRone HCL 10 MG TABLET] 180 tablet 1     Sig: TAKE 1 TABLET BY MOUTH 3 TIMES A DAY      Last office visit with prescribing clinician: 2/14/2025   Last telemedicine visit with prescribing clinician: Visit date not found   Next office visit with prescribing clinician: Visit date not found                         Would you like a call back once the refill request has been completed: [] Yes [] No    If the office needs to give you a call back, can they leave a voicemail: [] Yes [] No    Tyrone Metzger Rep  05/27/25, 10:36 EDT

## 2025-06-13 NOTE — TELEPHONE ENCOUNTER
Rx Refill Note  Requested Prescriptions     Pending Prescriptions Disp Refills    Lo Loestrin Fe 1 MG-10 MCG / 10 MCG tablet [Pharmacy Med Name: LO LOESTRIN FE 1-10 TABLET] 84 tablet 1     Sig: TAKE 1 TABLET BY MOUTH DAILY      Last office visit with prescribing clinician: 2/14/2025   Last telemedicine visit with prescribing clinician: Visit date not found   Next office visit with prescribing clinician: Visit date not found                         Would you like a call back once the refill request has been completed: [] Yes [] No    If the office needs to give you a call back, can they leave a voicemail: [] Yes [] No    Tyrone Metzger Rep  06/13/25, 09:09 EDT

## 2025-06-16 RX ORDER — NORETHINDRONE ACETATE AND ETHINYL ESTRADIOL, ETHINYL ESTRADIOL AND FERROUS FUMARATE 1MG-10(24)
1 KIT ORAL DAILY
Qty: 84 TABLET | Refills: 1 | Status: SHIPPED | OUTPATIENT
Start: 2025-06-16